# Patient Record
Sex: FEMALE | Race: WHITE | NOT HISPANIC OR LATINO | URBAN - METROPOLITAN AREA
[De-identification: names, ages, dates, MRNs, and addresses within clinical notes are randomized per-mention and may not be internally consistent; named-entity substitution may affect disease eponyms.]

---

## 2022-07-31 ENCOUNTER — INPATIENT (INPATIENT)
Facility: HOSPITAL | Age: 87
LOS: 3 days | Discharge: HOME | DRG: 176 | End: 2022-08-04
Attending: GENERAL PRACTICE | Admitting: GENERAL PRACTICE
Payer: MEDICARE

## 2022-07-31 VITALS
TEMPERATURE: 98 F | HEIGHT: 64 IN | OXYGEN SATURATION: 94 % | RESPIRATION RATE: 18 BRPM | DIASTOLIC BLOOD PRESSURE: 72 MMHG | HEART RATE: 108 BPM | WEIGHT: 111.99 LBS | SYSTOLIC BLOOD PRESSURE: 151 MMHG

## 2022-07-31 DIAGNOSIS — I49.9 CARDIAC ARRHYTHMIA, UNSPECIFIED: ICD-10-CM

## 2022-07-31 DIAGNOSIS — E05.90 THYROTOXICOSIS, UNSPECIFIED WITHOUT THYROTOXIC CRISIS OR STORM: ICD-10-CM

## 2022-07-31 DIAGNOSIS — E78.5 HYPERLIPIDEMIA, UNSPECIFIED: ICD-10-CM

## 2022-07-31 DIAGNOSIS — I82.90 ACUTE EMBOLISM AND THROMBOSIS OF UNSPECIFIED VEIN: ICD-10-CM

## 2022-07-31 DIAGNOSIS — I26.99 OTHER PULMONARY EMBOLISM WITHOUT ACUTE COR PULMONALE: ICD-10-CM

## 2022-07-31 LAB
ALBUMIN SERPL ELPH-MCNC: 4.1 G/DL — SIGNIFICANT CHANGE UP (ref 3.3–5)
ALP SERPL-CCNC: 74 U/L — SIGNIFICANT CHANGE UP (ref 40–120)
ALT FLD-CCNC: 15 U/L — SIGNIFICANT CHANGE UP (ref 10–45)
ANION GAP SERPL CALC-SCNC: 13 MMOL/L — SIGNIFICANT CHANGE UP (ref 5–17)
AST SERPL-CCNC: 15 U/L — SIGNIFICANT CHANGE UP (ref 10–40)
BASE EXCESS BLDV CALC-SCNC: -0.3 MMOL/L — SIGNIFICANT CHANGE UP (ref -2–2)
BASOPHILS # BLD AUTO: 0.02 K/UL — SIGNIFICANT CHANGE UP (ref 0–0.2)
BASOPHILS NFR BLD AUTO: 0.2 % — SIGNIFICANT CHANGE UP (ref 0–2)
BILIRUB SERPL-MCNC: 0.8 MG/DL — SIGNIFICANT CHANGE UP (ref 0.2–1.2)
BUN SERPL-MCNC: 19 MG/DL — SIGNIFICANT CHANGE UP (ref 7–23)
CA-I SERPL-SCNC: 1.18 MMOL/L — SIGNIFICANT CHANGE UP (ref 1.15–1.33)
CALCIUM SERPL-MCNC: 9.1 MG/DL — SIGNIFICANT CHANGE UP (ref 8.4–10.5)
CHLORIDE BLDV-SCNC: 102 MMOL/L — SIGNIFICANT CHANGE UP (ref 96–108)
CHLORIDE SERPL-SCNC: 101 MMOL/L — SIGNIFICANT CHANGE UP (ref 96–108)
CO2 BLDV-SCNC: 25 MMOL/L — SIGNIFICANT CHANGE UP (ref 22–26)
CO2 SERPL-SCNC: 22 MMOL/L — SIGNIFICANT CHANGE UP (ref 22–31)
CREAT SERPL-MCNC: 0.9 MG/DL — SIGNIFICANT CHANGE UP (ref 0.5–1.3)
EGFR: 61 ML/MIN/1.73M2 — SIGNIFICANT CHANGE UP
EOSINOPHIL # BLD AUTO: 0.03 K/UL — SIGNIFICANT CHANGE UP (ref 0–0.5)
EOSINOPHIL NFR BLD AUTO: 0.3 % — SIGNIFICANT CHANGE UP (ref 0–6)
FLUAV AG NPH QL: SIGNIFICANT CHANGE UP
FLUBV AG NPH QL: SIGNIFICANT CHANGE UP
GAS PNL BLDV: 133 MMOL/L — LOW (ref 136–145)
GAS PNL BLDV: SIGNIFICANT CHANGE UP
GAS PNL BLDV: SIGNIFICANT CHANGE UP
GLUCOSE BLDV-MCNC: 116 MG/DL — HIGH (ref 70–99)
GLUCOSE SERPL-MCNC: 119 MG/DL — HIGH (ref 70–99)
HCO3 BLDV-SCNC: 24 MMOL/L — SIGNIFICANT CHANGE UP (ref 22–29)
HCT VFR BLD CALC: 41.2 % — SIGNIFICANT CHANGE UP (ref 34.5–45)
HCT VFR BLDA CALC: 43 % — SIGNIFICANT CHANGE UP (ref 34.5–46.5)
HGB BLD CALC-MCNC: 14.3 G/DL — SIGNIFICANT CHANGE UP (ref 11.7–16.1)
HGB BLD-MCNC: 14.1 G/DL — SIGNIFICANT CHANGE UP (ref 11.5–15.5)
IMM GRANULOCYTES NFR BLD AUTO: 0.4 % — SIGNIFICANT CHANGE UP (ref 0–1.5)
LACTATE BLDV-MCNC: 1.4 MMOL/L — SIGNIFICANT CHANGE UP (ref 0.7–2)
LIDOCAIN IGE QN: 23 U/L — SIGNIFICANT CHANGE UP (ref 7–60)
LYMPHOCYTES # BLD AUTO: 0.79 K/UL — LOW (ref 1–3.3)
LYMPHOCYTES # BLD AUTO: 7.7 % — LOW (ref 13–44)
MAGNESIUM SERPL-MCNC: 1.6 MG/DL — SIGNIFICANT CHANGE UP (ref 1.6–2.6)
MCHC RBC-ENTMCNC: 29.7 PG — SIGNIFICANT CHANGE UP (ref 27–34)
MCHC RBC-ENTMCNC: 34.2 GM/DL — SIGNIFICANT CHANGE UP (ref 32–36)
MCV RBC AUTO: 86.9 FL — SIGNIFICANT CHANGE UP (ref 80–100)
MONOCYTES # BLD AUTO: 0.86 K/UL — SIGNIFICANT CHANGE UP (ref 0–0.9)
MONOCYTES NFR BLD AUTO: 8.4 % — SIGNIFICANT CHANGE UP (ref 2–14)
NEUTROPHILS # BLD AUTO: 8.48 K/UL — HIGH (ref 1.8–7.4)
NEUTROPHILS NFR BLD AUTO: 83 % — HIGH (ref 43–77)
NRBC # BLD: 0 /100 WBCS — SIGNIFICANT CHANGE UP (ref 0–0)
PCO2 BLDV: 38 MMHG — LOW (ref 39–42)
PH BLDV: 7.41 — SIGNIFICANT CHANGE UP (ref 7.32–7.43)
PLATELET # BLD AUTO: 191 K/UL — SIGNIFICANT CHANGE UP (ref 150–400)
PO2 BLDV: 33 MMHG — SIGNIFICANT CHANGE UP (ref 25–45)
POTASSIUM BLDV-SCNC: 4.3 MMOL/L — SIGNIFICANT CHANGE UP (ref 3.5–5.1)
POTASSIUM SERPL-MCNC: 4.3 MMOL/L — SIGNIFICANT CHANGE UP (ref 3.5–5.3)
POTASSIUM SERPL-SCNC: 4.3 MMOL/L — SIGNIFICANT CHANGE UP (ref 3.5–5.3)
PROT SERPL-MCNC: 7.6 G/DL — SIGNIFICANT CHANGE UP (ref 6–8.3)
RBC # BLD: 4.74 M/UL — SIGNIFICANT CHANGE UP (ref 3.8–5.2)
RBC # FLD: 13.2 % — SIGNIFICANT CHANGE UP (ref 10.3–14.5)
RSV RNA NPH QL NAA+NON-PROBE: SIGNIFICANT CHANGE UP
SAO2 % BLDV: 49.6 % — LOW (ref 67–88)
SARS-COV-2 RNA SPEC QL NAA+PROBE: SIGNIFICANT CHANGE UP
SODIUM SERPL-SCNC: 136 MMOL/L — SIGNIFICANT CHANGE UP (ref 135–145)
TROPONIN T, HIGH SENSITIVITY RESULT: 16 NG/L — SIGNIFICANT CHANGE UP (ref 0–51)
WBC # BLD: 10.22 K/UL — SIGNIFICANT CHANGE UP (ref 3.8–10.5)
WBC # FLD AUTO: 10.22 K/UL — SIGNIFICANT CHANGE UP (ref 3.8–10.5)

## 2022-07-31 PROCEDURE — 71275 CT ANGIOGRAPHY CHEST: CPT | Mod: 26,MA

## 2022-07-31 PROCEDURE — 93010 ELECTROCARDIOGRAM REPORT: CPT

## 2022-07-31 PROCEDURE — 71045 X-RAY EXAM CHEST 1 VIEW: CPT | Mod: 26

## 2022-07-31 PROCEDURE — 74177 CT ABD & PELVIS W/CONTRAST: CPT | Mod: 26,MA

## 2022-07-31 PROCEDURE — 99291 CRITICAL CARE FIRST HOUR: CPT | Mod: 25

## 2022-07-31 RX ORDER — LANOLIN ALCOHOL/MO/W.PET/CERES
3 CREAM (GRAM) TOPICAL AT BEDTIME
Refills: 0 | Status: DISCONTINUED | OUTPATIENT
Start: 2022-07-31 | End: 2022-08-04

## 2022-07-31 RX ORDER — ASPIRIN/CALCIUM CARB/MAGNESIUM 324 MG
81 TABLET ORAL DAILY
Refills: 0 | Status: DISCONTINUED | OUTPATIENT
Start: 2022-07-31 | End: 2022-08-04

## 2022-07-31 RX ORDER — ACETAMINOPHEN 500 MG
650 TABLET ORAL EVERY 6 HOURS
Refills: 0 | Status: DISCONTINUED | OUTPATIENT
Start: 2022-07-31 | End: 2022-08-04

## 2022-07-31 RX ORDER — DONEPEZIL HYDROCHLORIDE 10 MG/1
5 TABLET, FILM COATED ORAL AT BEDTIME
Refills: 0 | Status: DISCONTINUED | OUTPATIENT
Start: 2022-07-31 | End: 2022-08-04

## 2022-07-31 RX ORDER — ATORVASTATIN CALCIUM 80 MG/1
20 TABLET, FILM COATED ORAL AT BEDTIME
Refills: 0 | Status: DISCONTINUED | OUTPATIENT
Start: 2022-07-31 | End: 2022-08-04

## 2022-07-31 RX ORDER — MORPHINE SULFATE 50 MG/1
2 CAPSULE, EXTENDED RELEASE ORAL ONCE
Refills: 0 | Status: DISCONTINUED | OUTPATIENT
Start: 2022-07-31 | End: 2022-07-31

## 2022-07-31 RX ORDER — ONDANSETRON 8 MG/1
4 TABLET, FILM COATED ORAL EVERY 8 HOURS
Refills: 0 | Status: DISCONTINUED | OUTPATIENT
Start: 2022-07-31 | End: 2022-08-04

## 2022-07-31 RX ORDER — ATORVASTATIN CALCIUM 80 MG/1
1 TABLET, FILM COATED ORAL
Qty: 0 | Refills: 0 | DISCHARGE

## 2022-07-31 RX ORDER — HEPARIN SODIUM 5000 [USP'U]/ML
4500 INJECTION INTRAVENOUS; SUBCUTANEOUS ONCE
Refills: 0 | Status: COMPLETED | OUTPATIENT
Start: 2022-07-31 | End: 2022-07-31

## 2022-07-31 RX ORDER — HEPARIN SODIUM 5000 [USP'U]/ML
INJECTION INTRAVENOUS; SUBCUTANEOUS
Qty: 25000 | Refills: 0 | Status: DISCONTINUED | OUTPATIENT
Start: 2022-07-31 | End: 2022-08-02

## 2022-07-31 RX ORDER — HEPARIN SODIUM 5000 [USP'U]/ML
2000 INJECTION INTRAVENOUS; SUBCUTANEOUS EVERY 6 HOURS
Refills: 0 | Status: DISCONTINUED | OUTPATIENT
Start: 2022-07-31 | End: 2022-08-02

## 2022-07-31 RX ORDER — DONEPEZIL HYDROCHLORIDE 10 MG/1
1 TABLET, FILM COATED ORAL
Qty: 0 | Refills: 0 | DISCHARGE

## 2022-07-31 RX ORDER — ASPIRIN/CALCIUM CARB/MAGNESIUM 324 MG
1 TABLET ORAL
Qty: 0 | Refills: 0 | DISCHARGE

## 2022-07-31 RX ORDER — METHIMAZOLE 10 MG/1
0.5 TABLET ORAL
Qty: 0 | Refills: 0 | DISCHARGE

## 2022-07-31 RX ORDER — METOPROLOL TARTRATE 50 MG
25 TABLET ORAL
Refills: 0 | Status: DISCONTINUED | OUTPATIENT
Start: 2022-07-31 | End: 2022-08-04

## 2022-07-31 RX ORDER — METOPROLOL TARTRATE 50 MG
1 TABLET ORAL
Qty: 0 | Refills: 0 | DISCHARGE

## 2022-07-31 RX ORDER — HEPARIN SODIUM 5000 [USP'U]/ML
4500 INJECTION INTRAVENOUS; SUBCUTANEOUS EVERY 6 HOURS
Refills: 0 | Status: DISCONTINUED | OUTPATIENT
Start: 2022-07-31 | End: 2022-08-02

## 2022-07-31 RX ADMIN — MORPHINE SULFATE 2 MILLIGRAM(S): 50 CAPSULE, EXTENDED RELEASE ORAL at 12:46

## 2022-07-31 RX ADMIN — HEPARIN SODIUM 1100 UNIT(S)/HR: 5000 INJECTION INTRAVENOUS; SUBCUTANEOUS at 17:20

## 2022-07-31 RX ADMIN — MORPHINE SULFATE 2 MILLIGRAM(S): 50 CAPSULE, EXTENDED RELEASE ORAL at 15:36

## 2022-07-31 RX ADMIN — HEPARIN SODIUM 4500 UNIT(S): 5000 INJECTION INTRAVENOUS; SUBCUTANEOUS at 17:20

## 2022-07-31 NOTE — ED PROVIDER NOTE - CLINICAL SUMMARY MEDICAL DECISION MAKING FREE TEXT BOX
Pt is a 89 yo f with pmh arrythmia (unknown which kind) and hypothyroid who presents to ED with few days of ruq abdominal pain that radiates to back and shortness of breath. r/o PE vs gallstone. Pt is hypoxic to 95%, on 2l NC.
71

## 2022-07-31 NOTE — ED PROVIDER NOTE - PROGRESS NOTE DETAILS
Prudencio Bynum, PGY2 Pt has b/l PE with r heart strain.   Spoke with cards fellow for PE and right heart strain.   Vascular surgery was consulted for aortic ulcers. awaiting recs for heparin in setting aortic ulcers. .ndb pages vascular again  on call sheet  and paged [phone] and   found on amion Mushtaq Arciniega MD: Patient was signed out to me pending cardiology consultation for PE with right heart strain and vascular surgery consultation for penetrating aortic ulcer to make sure patient can be anticoagulated. Seen by cardiology team, no plan for catheter directed thrombolysis, but agreed with plan for anticoagulation. Unable to obtain callback from vascular team, which Dr. Ten Ferguson who was made aware of patient and clinical and imaging findings, agreed with plan for anticoagulation, and will have vascular team see patient.

## 2022-07-31 NOTE — CONSULT NOTE ADULT - SUBJECTIVE AND OBJECTIVE BOX
CHIEF COMPLAINT:Patient is a 88y old  Female who presents with a chief complaint of     HPI:   Pt is a 87 yo f poor-historian with pmh arrythmia (unknown which kind) and hypothyroid who presents to ED with few days of ruq abdominal pain that radiates to back and shortness of breath, no cp, n/v/f/c. No hx of dvt. Takes aspirin intermittently for pain, no AC    PAST MEDICAL & SURGICAL HISTORY:      MEDICATIONS  (STANDING):  heparin  Infusion.  Unit(s)/Hr (11 mL/Hr) IV Continuous <Continuous>  morphine  - Injectable 2 milliGRAM(s) IV Push once    MEDICATIONS  (PRN):  heparin   Injectable 4500 Unit(s) IV Push every 6 hours PRN For aPTT less than 40  heparin   Injectable 2000 Unit(s) IV Push every 6 hours PRN For aPTT between 40 - 57      FAMILY HISTORY:      SOCIAL HISTORY:    [ ] Non-smoker  [ ] Smoker  [ ] Alcohol    Allergies    No Known Allergies    Intolerances    	    REVIEW OF SYSTEMS:  CONSTITUTIONAL: No fever, weight loss, or fatigue  EYES: No eye pain, visual disturbances, or discharge  ENT:  No difficulty hearing, tinnitus, vertigo; No sinus or throat pain  NECK: No pain or stiffness  RESPIRATORY: No cough, wheezing, chills or hemoptysis; No Shortness of Breath  CARDIOVASCULAR: + chest pain, palpitations, passing out, dizziness, or leg swelling  GASTROINTESTINAL: + abdominal or epigastric pain. No nausea, vomiting, or hematemesis; No diarrhea or constipation. No melena or hematochezia.  GENITOURINARY: No dysuria, frequency, hematuria, or incontinence  NEUROLOGICAL: No headaches, memory loss, loss of strength, numbness, or tremors  SKIN: No itching, burning, rashes, or lesions   LYMPH Nodes: No enlarged glands  ENDOCRINE: No heat or cold intolerance; No hair loss  MUSCULOSKELETAL: No joint pain or swelling; No muscle, back, or extremity pain  PSYCHIATRIC: No depression, anxiety, mood swings, or difficulty sleeping  HEME/LYMPH: No easy bruising, or bleeding gums  ALLERGY AND IMMUNOLOGIC: No hives or eczema	    [ ] All others negative	  [ ] Unable to obtain    PHYSICAL EXAM:  T(C): 37.6 (07-31-22 @ 16:57), Max: 37.6 (07-31-22 @ 16:57)  HR: 99 (07-31-22 @ 16:57) (99 - 110)  BP: 138/67 (07-31-22 @ 16:57) (129/64 - 151/72)  RR: 37 (07-31-22 @ 16:57) (18 - 37)  SpO2: 95% (07-31-22 @ 16:57) (94% - 95%)  Wt(kg): --  I&O's Summary      Appearance: Normal	  HEENT:   Normal oral mucosa, PERRL, EOMI	  Lymphatic: No lymphadenopathy  Cardiovascular: Normal S1 S2, No JVD, + murmurs, No edema  Respiratory: rhonchi  Psychiatry: A & O x 3, Mood & affect appropriate  Gastrointestinal:  Soft, Non-tender, + BS	  Skin: No rashes, No ecchymoses, No cyanosis	  Neurologic: Non-focal  Extremities: Normal range of motion, No clubbing, cyanosis or edema  Vascular: Peripheral pulses palpable 2+ bilaterally    TELEMETRY: 	    ECG:  	  RADIOLOGY:  OTHER: 	  	  LABS:	 	    CARDIAC MARKERS:                              14.1   10.22 )-----------( 191      ( 31 Jul 2022 12:40 )             41.2     07-31    136  |  101  |  19  ----------------------------<  119<H>  4.3   |  22  |  0.90    Ca    9.1      31 Jul 2022 12:40  Mg     1.6     07-31    TPro  7.6  /  Alb  4.1  /  TBili  0.8  /  DBili  x   /  AST  15  /  ALT  15  /  AlkPhos  74  07-31    proBNP:   Lipid Profile:   HgA1c:   TSH:       PREVIOUS DIAGNOSTIC TESTING:    < from: CT Abdomen and Pelvis w/ IV Cont (07.31.22 @ 13:48) >  Acute bilateral pulmonary embolism, more prominent in the right lower   lobe. Right heart strain. Patchy airspace consolidation in the right   lower lobe and right middle lobe which likely represent   atelectasis/pneumonia, however pulmonary infarct cannot be excluded in   the setting of acute pulmonary embolism. Trace right pleural effusion.    Acute DVT left femoral vein, possible acute DVT right femoral vein.    Ulcerated plaques of the aortic arch and proximal descending thoracic   aorta.  1.9 cm focal outpouching of contrast into the left lateral aortic   wall at the aortic hiatus consistent with a penetrating ulcer.    - asymptomatic aortic ulceration - no acute surgical intervention okay to give hep gtt for PEs  - appreciate recs of primary team  - will follow  - pain is not characteristic 2/2 to aortic etiology, continue to monitor pain and physical exam    HDS, troponin negative. Currently on 2L NC. Patient also found to have ulcerated plaque in aorta.   -Recommend echo   -Please place on heparin gtt when cleared by vascular given her ulcerated plaque   -Recommend DVT doppler bilateral in LE   -Tele   -Wean O2 as able   -Please obtain patient's med rec     < from: CT Abdomen and Pelvis w/ IV Cont (07.31.22 @ 13:48) >  RETROPERITONEUM/LYMPH NODES: No lymphadenopathy.  ABDOMINAL WALL: Within normal limits.  BONES: Mild thoracolumbar scoliosis.Multilevel degenerative changes of   the spine worse at L4-5 and L5-S1. Grade 1 anterolisthesis L4 over L5.    < end of copied text >

## 2022-07-31 NOTE — H&P ADULT - HISTORY OF PRESENT ILLNESS
>>>>>>Medical Attending Initial / Admission note<<<<<<  -------------------------------------------------------------------------------  CHIEF COMPLAINT & HISTORY OF PRESENT ILLNESS:      history per chart and Dtr ( mostly Mosotho speaking)   Patient is a 89 yo Female ( lives independently, normal mental status per Dtr)  with past medical history of arrythmia (unknown which kind) and hyperthyroidism, prior DVT and HLD, who presents to ED with few days of ruq abdominal pain that radiates to back and shortness of breath, no cp, n/v/f/c.   pt in ED diagnosed with acute PE as bellow and started on Heparin drip  pt otherwise comfortable, on minimal O2   pt also incidentally on CT found to have an aortic ulcer as bellow, vascular consulted and noted   per pt's Dtr pt had a DVT last year, was on Xarelto but pt had reportedly stopped taking it ? months ago as was feeling better..     --------------------------------------------------------------------------------  PAST MEDICAL HISTORY:    hyperthyroidism  DVT h/o ~ 2021  HLD  ? MCI    --------------------------------------------------------------------------------  PAST SURGICAL HISTORY:    none reported    --------------------------------------------------------------------------------  FAMILY HISTORY:    no known cancer of heart disease reported in family    --------------------------------------------------------------------------------  SOCIAL HISTORY:  Alcohol: None reported  Smoking: None reported     --------------------------------------------------------------------------------  ALLERGIES:    [As bellow, reviewed]    --------------------------------------------------------------------------------  MEDICATIONS:   [As bellow, reviewed]    --------------------------------------------------------------------------------  REVIEW OF SYSTEM:    limited   GEN: no fever, no pain  RESP: no SOB, no cough  CVS: no chest pain, no palpitations  GI: no abdominal pain, no nausea  : no dysuria, no frequency  Neuro: no headache, no dizziness  Derm : no itching, no rash    --------------------------------------------------------------------------------  VITAL SIGNS:  Height (cm): 162.6  Weight (kg): 55.8  BMI (kg/m2): 21.1  Vital Signs Last 24 HrsT(C): 37.6 (07-31-22 @ 16:57)  T(F): 99.6 (07-31-22 @ 16:57), Max: 99.6 (07-31-22 @ 16:57)  HR: 99 (07-31-22 @ 16:57) (99 - 110)  BP: 138/67 (07-31-22 @ 16:57)  RR: 37 (07-31-22 @ 16:57) (18 - 37)  SpO2: 95% (07-31-22 @ 16:57) (94% - 95%)      --------------------------------------------------------------------------------  PHYSICAL EXAM:    GEN: alert and awake, NAD , comfortable, pleasant, calm  HEENT: NCAT, PERRL, MMM, hearing intact  Neck: supple , no JVD  CVS: S1S2 , regular , No M/R/G appreciated  PULM: CTA B/L,  no W/R/R appreciated  ABD.: soft. non tender, non distended,  bowel sounds present  Extrem: intact pulses , no edema   Derm: No rash , no ecchymoses  PSYCH : normal mood,  no delusion not anxious    --------------------------------------------------------------------------------  LAB AND IMAGING:   [As colby, bob]    --------------------------------------------------------------------------------  ASSESSMENT AND PLAN:   [As belljessica]    --------------------  Case discussed with pt, Dtr, Cardio   ___________________________  H. DORA Collins.  Pager: [phone]  07-31-22   >>>>>>Medical Attending Initial / Admission note<<<<<<  -------------------------------------------------------------------------------  CHIEF COMPLAINT & HISTORY OF PRESENT ILLNESS:      history per chart and Dtr ( mostly Japanese speaking)   Patient is a 89 yo Female ( lives independently, normal mental status per Dtr)  with past medical history of arrythmia (unknown which kind) and hyperthyroidism, prior DVT and HLD, who presents to ED with few days of ruq abdominal pain that radiates to back and shortness of breath, no cp, n/v/f/c.   pt in ED diagnosed with acute PE as bellow and started on Heparin drip  pt otherwise comfortable, on minimal O2   pt also incidentally on CT found to have an aortic ulcerated plaque as bellow, vascular consulted and noted   per pt's Dtr pt had a DVT last year, was on Xarelto but pt had reportedly stopped taking it ? months ago as was feeling better..     --------------------------------------------------------------------------------  PAST MEDICAL HISTORY:    hyperthyroidism  DVT h/o ~ 2021  HLD  ? MCI    --------------------------------------------------------------------------------  PAST SURGICAL HISTORY:    none reported    --------------------------------------------------------------------------------  FAMILY HISTORY:    no known cancer of heart disease reported in family    --------------------------------------------------------------------------------  SOCIAL HISTORY:  Alcohol: None reported  Smoking: None reported     --------------------------------------------------------------------------------  ALLERGIES:    [As bellow, reviewed]    --------------------------------------------------------------------------------  MEDICATIONS:   [As bellow, reviewed]    --------------------------------------------------------------------------------  REVIEW OF SYSTEM:    limited   GEN: no fever, no pain  RESP: no SOB, no cough  CVS: no chest pain, no palpitations  GI: no abdominal pain, no nausea  : no dysuria, no frequency  Neuro: no headache, no dizziness  Derm : no itching, no rash    --------------------------------------------------------------------------------  VITAL SIGNS:  Height (cm): 162.6  Weight (kg): 55.8  BMI (kg/m2): 21.1  Vital Signs Last 24 HrsT(C): 37.6 (07-31-22 @ 16:57)  T(F): 99.6 (07-31-22 @ 16:57), Max: 99.6 (07-31-22 @ 16:57)  HR: 99 (07-31-22 @ 16:57) (99 - 110)  BP: 138/67 (07-31-22 @ 16:57)  RR: 37 (07-31-22 @ 16:57) (18 - 37)  SpO2: 95% (07-31-22 @ 16:57) (94% - 95%)      --------------------------------------------------------------------------------  PHYSICAL EXAM:    GEN: alert and awake, NAD , comfortable, pleasant, calm  HEENT: NCAT, PERRL, MMM, hearing intact  Neck: supple , no JVD  CVS: S1S2 , regular , No M/R/G appreciated  PULM: CTA B/L,  no W/R/R appreciated  ABD.: soft. non tender, non distended,  bowel sounds present  Extrem: intact pulses , no edema   Derm: No rash , no ecchymoses  PSYCH : normal mood,  no delusion not anxious    --------------------------------------------------------------------------------  LAB AND IMAGING:   [As colby, bob]    --------------------------------------------------------------------------------  ASSESSMENT AND PLAN:   [As bellow]    --------------------  Case discussed with pt, Dtr, Cardio   ___________________________  H. DORA Collins.  Pager: [phone]  07-31-22

## 2022-07-31 NOTE — H&P ADULT - PROBLEM SELECTOR PLAN 1
pt with proximal DVTs and bilateral PEs  pt was supposed to be on Xarelto but stopped taking it per Dtr..   pt on heparin drip  likely will transition back to Xarelto or other NOAcs soon     will need life long AC ( D/W Dtr)   check Echo  tele  malignancy workup / cancer markers  monitor vitals labs

## 2022-07-31 NOTE — CONSULT NOTE ADULT - SUBJECTIVE AND OBJECTIVE BOX
VASCULAR SURGERY CONSULT NOTE  --------------------------------------------------------------------------------------------    Patient is a 88y old  Female who presents with a chief complaint of     HPI: HPI:      ROS: 10-system review is otherwise negative except HPI above.      PAST MEDICAL & SURGICAL HISTORY:    FAMILY HISTORY:      SOCIAL HISTORY:      ALLERGIES: No Known Allergies      HOME MEDICATIONS:     CURRENT MEDICATIONS  MEDICATIONS (STANDING): heparin  Infusion.  Unit(s)/Hr IV Continuous <Continuous>  morphine  - Injectable 2 milliGRAM(s) IV Push Once    MEDICATIONS (PRN):heparin   Injectable 4500 Unit(s) IV Push every 6 hours PRN For aPTT less than 40  heparin   Injectable 2000 Unit(s) IV Push every 6 hours PRN For aPTT between 40 - 57    --------------------------------------------------------------------------------------------    Vitals:   T(C): 37.6 (07-31-22 @ 16:57), Max: 37.6 (07-31-22 @ 16:57)  HR: 99 (07-31-22 @ 16:57) (99 - 110)  BP: 138/67 (07-31-22 @ 16:57) (129/64 - 151/72)  RR: 37 (07-31-22 @ 16:57) (18 - 37)  SpO2: 95% (07-31-22 @ 16:57) (94% - 95%)  CAPILLARY BLOOD GLUCOSE        CAPILLARY BLOOD GLUCOSE          Height (cm): 162.6 (07-31 @ 11:33)  Weight (kg): 55.8 (07-31 @ 14:37)  BMI (kg/m2): 21.1 (07-31 @ 14:37)  BSA (m2): 1.59 (07-31 @ 14:37)    PHYSICAL EXAM:   General: NAD, Lying in bed comfortably  Neuro: A+Ox3  HEENT: NC/AT, EOMI  Neck: Soft, supple  Cardio: RRR, nml S1/S2  Resp: Good effort, on oxygen, tachypnea  GI/Abd: Soft, NT/ND, no rebound/guarding, no masses palpated  Vascular: distal pulses intact, dp/pt palpable b/l  Skin: Intact, no breakdown  Lymphatic/Nodes: No palpable lymphadenopathy  Musculoskeletal: All 4 extremities moving spontaneously, no limitations.  --------------------------------------------------------------------------------------------    LABS  CBC (07-31 @ 12:40)                              14.1                           10.22   )----------------(  191        83.0<H>% Neutrophils, 7.7<L>% Lymphocytes, ANC: 8.48<H>                              41.2      BMP (07-31 @ 12:40)             136     |  101     |  19    		Ca++ --      Ca 9.1                ---------------------------------( 119<H>		Mg 1.6                4.3     |  22      |  0.90  			Ph --        LFTs (07-31 @ 12:40)      TPro 7.6 / Alb 4.1 / TBili 0.8 / DBili -- / AST 15 / ALT 15 / AlkPhos 74          VBG (07-31 @ 12:31)     7.41 / 38<L> / 33 / 24 / -0.3 / 49.6<L>%     Lactate: 1.4    --------------------------------------------------------------------------------------------    MICROBIOLOGY      --------------------------------------------------------------------------------------------    IMAGING     VASCULAR SURGERY CONSULT NOTE  --------------------------------------------------------------------------------------------    Patient is a 88y old  Female who presents with a chief complaint of shortness of breath    HPI: HPI:  89 yo F w/ PMH hypothyroidism who presents with chest pain and SOB. Since Friday, patient has been having right sided chest pain radiating to her back. Worse when lying flat. Also has been feeling SOB as well at rest and on exertion. Denies any palpitations, lightheadedness, dizziness. Never had these symptoms before. Never had leg pain or swelling. Denies any hx of malignancy, air travel, recent surgeries. Not up to date on her cancer screenings.     In the ED, low 90s on RA, put on 2L NC, HR in low 100s, rest of vitals stable.     Vascular consulted due to incidental aortic ulceration found on CT imaging. She also has complaints of mild lower back pain mostly on the right that is worse with pressure.     ROS: 10-system review is otherwise negative except HPI above.      PAST MEDICAL & SURGICAL HISTORY:    FAMILY HISTORY:      SOCIAL HISTORY:      ALLERGIES: No Known Allergies      HOME MEDICATIONS:     CURRENT MEDICATIONS  MEDICATIONS (STANDING): heparin  Infusion.  Unit(s)/Hr IV Continuous <Continuous>  morphine  - Injectable 2 milliGRAM(s) IV Push Once    MEDICATIONS (PRN):heparin   Injectable 4500 Unit(s) IV Push every 6 hours PRN For aPTT less than 40  heparin   Injectable 2000 Unit(s) IV Push every 6 hours PRN For aPTT between 40 - 57    --------------------------------------------------------------------------------------------    Vitals:   T(C): 37.6 (07-31-22 @ 16:57), Max: 37.6 (07-31-22 @ 16:57)  HR: 99 (07-31-22 @ 16:57) (99 - 110)  BP: 138/67 (07-31-22 @ 16:57) (129/64 - 151/72)  RR: 37 (07-31-22 @ 16:57) (18 - 37)  SpO2: 95% (07-31-22 @ 16:57) (94% - 95%)  CAPILLARY BLOOD GLUCOSE        CAPILLARY BLOOD GLUCOSE          Height (cm): 162.6 (07-31 @ 11:33)  Weight (kg): 55.8 (07-31 @ 14:37)  BMI (kg/m2): 21.1 (07-31 @ 14:37)  BSA (m2): 1.59 (07-31 @ 14:37)    PHYSICAL EXAM:   General: NAD, Lying in bed comfortably  Neuro: A+Ox3  HEENT: NC/AT, EOMI  Neck: Soft, supple  Cardio: RRR, nml S1/S2  Resp: Good effort, on oxygen, tachypnea  GI/Abd: Soft, NT/ND, no rebound/guarding, no masses palpated  Vascular: distal pulses intact, dp/pt palpable b/l  Skin: Intact, no breakdown  Lymphatic/Nodes: No palpable lymphadenopathy  Musculoskeletal: All 4 extremities moving spontaneously, no limitations. minimal tenderness to palpation over right sided paraspinal muscles  --------------------------------------------------------------------------------------------    LABS  CBC (07-31 @ 12:40)                              14.1                           10.22   )----------------(  191        83.0<H>% Neutrophils, 7.7<L>% Lymphocytes, ANC: 8.48<H>                              41.2      BMP (07-31 @ 12:40)             136     |  101     |  19    		Ca++ --      Ca 9.1                ---------------------------------( 119<H>		Mg 1.6                4.3     |  22      |  0.90  			Ph --        LFTs (07-31 @ 12:40)      TPro 7.6 / Alb 4.1 / TBili 0.8 / DBili -- / AST 15 / ALT 15 / AlkPhos 74          VBG (07-31 @ 12:31)     7.41 / 38<L> / 33 / 24 / -0.3 / 49.6<L>%     Lactate: 1.4    --------------------------------------------------------------------------------------------    MICROBIOLOGY      --------------------------------------------------------------------------------------------    IMAGING

## 2022-07-31 NOTE — ED PROVIDER NOTE - ATTENDING CONTRIBUTION TO CARE
87 yo female p/w vague back discomfort, accompanied by son, notes his mother usually does not complain and that he feared something was wrong.  CT chest/abdomen/pelvis with bilateral PEs, right heart strain, and aortic ulceration noted.  urgent consult to vascular surgery, clarify ability to anticoagulate in the setting of aortic ulceration, otherwise will start heparin GTT, PE response team consult, admit for further management.

## 2022-07-31 NOTE — CONSULT NOTE ADULT - ATTENDING COMMENTS
Continue with anticoagulation alone  no plans for any catheter based therapies for this PE  At some point would convert to DOAC  She has proximal L DVT, consider CT Abd/Pelvis for malignancy screening    Aisha [phone]

## 2022-07-31 NOTE — H&P ADULT - ASSESSMENT
Patient is a 87 yo Female ( lives independently, normal mental status per Dtr)  with past medical history of arrythmia (unknown which kind) and hyperthyroidism, prior DVT and HLD, who presents to ED with few days of ruq abdominal pain that radiates to back and shortness of breath, no cp, n/v/f/c.   pt in ED diagnosed with acute PE as bellow and started on Heparin drip  pt otherwise comfortable, on minimal O2   pt also incidentally on CT found to have an aortic ulcerated plaque as bellow, vascular consulted and noted   per pt's Dtr pt had a DVT last year, was on Xarelto but pt had reportedly stopped taking it ? months ago as was feeling better..

## 2022-07-31 NOTE — CONSULT NOTE ADULT - ASSESSMENT
89 yo F w/ PMH hypothyroidism who presents with chest pain and SOB found to have bilateral PE.     #Bilateral PE   HDS, troponin negative. Currently on 2L NC. Patient also found to have ulcerated plaque in aorta.   -Recommend echo   -Please place on heparin gtt when cleared by vascular given her ulcerated plaque   -Recommend DVT doppler bilateral in LE   -Tele   -Wean O2 as able   -Please obtain patient's med rec

## 2022-07-31 NOTE — H&P ADULT - CONVERSATION DETAILS
*** Advance directive /  goals of care discussion      I had a long discussion with patient ( and family) about patient's overall diagnosis, expected prognosis, and potential complications.       Discussed treatment options, comfort care / hospice as appropriate, and all other potential options of care.         Discussed risks, benefits, and alternatives of treatment as well.          Opportunity given for and all questions answered.            Reviewed available advance directives as available > pt has a living will, which the Dtr is not aware of the detail... pt's Dtr is HCP>> they will be reaching out to the  to get copies of the living will and other paper work ..      At this time patient to be > full code, with continuation of current medical therapy.     Goal is for pt to return > home and follow up as outpatient with current doctors      will continue to discuss GOC with pt and family and update plan as needed.     Patient's family have my contact information and will contact me with questions.     Additional time spent on Goals of care: 22 min.

## 2022-07-31 NOTE — CONSULT NOTE ADULT - SUBJECTIVE AND OBJECTIVE BOX
Patient seen and evaluated at bedside    Chief Complaint:    HPI:  87 yo F w/ PMH hypothyroidism who presents with chest pain and SOB. Since Friday, patient has been having ri    PMHx:       PSHx:       Allergies:  No Known Allergies      Home Meds:    Current Medications:   morphine  - Injectable 2 milliGRAM(s) IV Push Once      FAMILY HISTORY:      Social History:  Smoking History:  Alcohol Use:  Drug Use:    REVIEW OF SYSTEMS:  Constitutional:     [x ] negative [ ] fevers [ ] chills [ ] weight loss [ ] weight gain  HEENT:                  [x ] negative [ ] dry eyes [ ] eye irritation [ ] postnasal drip [ ] nasal congestion  CV:                         [ x] negative  [ ] chest pain [ ] orthopnea [ ] palpitations [ ] murmur  Resp:                     [x ] negative [ ] cough [ ] shortness of breath [ ] dyspnea [ ] wheezing [ ] sputum [ ]hemoptysis  GI:                          [ x] negative [ ] nausea [ ] vomiting [ ] diarrhea [ ] constipation [ ] abd pain [ ] dysphagia   :                        [ x] negative [ ] dysuria [ ] nocturia [ ] hematuria [ ] increased urinary frequency  Musculoskeletal: [x ] negative [ ] back pain [ ] myalgias [ ] arthralgias [ ] fracture  Skin:                       [ x] negative [ ] rash [ ] itch  Neurological:        [ x] negative [ ] headache [ ] dizziness [ ] syncope [ ] weakness [ ] numbness  Psychiatric:           [ x] negative [ ] anxiety [ ] depression  Endocrine:            [ x] negative [ ] diabetes [ ] thyroid problem  Heme/Lymph:      [ x] negative [ ] anemia [ ] bleeding problem  Allergic/Immune: [ x] negative [ ] itchy eyes [ ] nasal discharge [ ] hives [ ] angioedema    [ x] All other systems negative  [ ] Unable to assess ROS due to      Physical Exam:  T(F): 98.3 (07-31), Max: 98.3 (07-31)  HR: 105 (07-31) (103 - 110)  BP: 149/74 (07-31) (143/66 - 151/72)  RR: 27 (07-31)  SpO2: 95% (07-31)  GENERAL: No acute distress, well-developed  HEAD:  Atraumatic, Normocephalic  ENT: EOMI, PERRLA, conjunctiva and sclera clear, Neck supple, No JVD, moist mucosa  CHEST/LUNG: Clear to auscultation bilaterally; No wheeze, equal breath sounds bilaterally   BACK: No spinal tenderness  HEART: Regular rate and rhythm; No murmurs, rubs, or gallops  ABDOMEN: Soft, Nontender, Nondistended; Bowel sounds present  EXTREMITIES:  No clubbing, cyanosis, or edema  PSYCH: Nl behavior, nl affect  NEUROLOGY: AAOx3, non-focal, cranial nerves intact  SKIN: Normal color, No rashes or lesions  LINES:    Cardiovascular Diagnostic Testing:    ECG: Personally reviewed:    Echo: Personally reviewed:    Stress Testing:    Cath:    Imaging:    CXR: Personally reviewed    Labs: Personally reviewed                        14.1   10.22 )-----------( 191      ( 31 Jul 2022 12:40 )             41.2     07-31    136  |  101  |  19  ----------------------------<  119<H>  4.3   |  22  |  0.90    Ca    9.1      31 Jul 2022 12:40  Mg     1.6     07-31    TPro  7.6  /  Alb  4.1  /  TBili  0.8  /  DBili  x   /  AST  15  /  ALT  15  /  AlkPhos  74  07-31             Patient seen and evaluated at bedside    Chief Complaint:    HPI:  87 yo F w/ PMH hypothyroidism who presents with chest pain and SOB. Since Friday, patient has been having right sided chest pain radiating to her back. Worse when lying flat. Also has been feeling SOB as well at rest and on exertion. Denies any palpitations, lightheadedness, dizziness. Never had these symptoms before. Never had leg pain or swelling. Denies any hx of malignancy, air travel, recent surgeries. Not up to date on her cancer screenings.     In the ED, low 90s on RA, put on 2L NC, HR in low 100s, rest of vitals stable.     EKG shows sinus tachycardia     PMHx:       PSHx:       Allergies:  No Known Allergies      Home Meds:    Current Medications:   morphine  - Injectable 2 milliGRAM(s) IV Push Once      FAMILY HISTORY:      Social History:  Smoking History:  Alcohol Use:  Drug Use:    REVIEW OF SYSTEMS:  Constitutional:     [x ] negative [ ] fevers [ ] chills [ ] weight loss [ ] weight gain  HEENT:                  [x ] negative [ ] dry eyes [ ] eye irritation [ ] postnasal drip [ ] nasal congestion  CV:                         [ x] negative  [ ] chest pain [ ] orthopnea [ ] palpitations [ ] murmur  Resp:                     [x ] negative [ ] cough [ ] shortness of breath [ ] dyspnea [ ] wheezing [ ] sputum [ ]hemoptysis  GI:                          [ x] negative [ ] nausea [ ] vomiting [ ] diarrhea [ ] constipation [ ] abd pain [ ] dysphagia   :                        [ x] negative [ ] dysuria [ ] nocturia [ ] hematuria [ ] increased urinary frequency  Musculoskeletal: [x ] negative [ ] back pain [ ] myalgias [ ] arthralgias [ ] fracture  Skin:                       [ x] negative [ ] rash [ ] itch  Neurological:        [ x] negative [ ] headache [ ] dizziness [ ] syncope [ ] weakness [ ] numbness  Psychiatric:           [ x] negative [ ] anxiety [ ] depression  Endocrine:            [ x] negative [ ] diabetes [ ] thyroid problem  Heme/Lymph:      [ x] negative [ ] anemia [ ] bleeding problem  Allergic/Immune: [ x] negative [ ] itchy eyes [ ] nasal discharge [ ] hives [ ] angioedema    [ x] All other systems negative  [ ] Unable to assess ROS due to      Physical Exam:  T(F): 98.3 (07-31), Max: 98.3 (07-31)  HR: 105 (07-31) (103 - 110)  BP: 149/74 (07-31) (143/66 - 151/72)  RR: 27 (07-31)  SpO2: 95% (07-31)  GENERAL: No acute distress, on NC   CHEST/LUNG: Clear to auscultation bilaterally; No wheeze, equal breath sounds bilaterally   HEART: Regular rate and rhythm; No murmurs, rubs, or gallops  EXTREMITIES:  No clubbing, cyanosis, or edema  PSYCH: Nl behavior, nl affect  NEUROLOGY: AAOx3, non-focal   SKIN: Normal color, No rashes or lesions  LINES:    Cardiovascular Diagnostic Testing:    ECG: Personally reviewed:    Echo: Personally reviewed:    Stress Testing:    Cath:    Imaging:    CXR: Personally reviewed    Labs: Personally reviewed                        14.1   10.22 )-----------( 191      ( 31 Jul 2022 12:40 )             41.2     07-31    136  |  101  |  19  ----------------------------<  119<H>  4.3   |  22  |  0.90    Ca    9.1      31 Jul 2022 12:40  Mg     1.6     07-31    TPro  7.6  /  Alb  4.1  /  TBili  0.8  /  DBili  x   /  AST  15  /  ALT  15  /  AlkPhos  74  07-31

## 2022-07-31 NOTE — CONSULT NOTE ADULT - ASSESSMENT
89 yo F w/ PMH hypothyroidism who presents with chest pain and SOB found to have bilateral PE and 1.9 cm aortic ulceration    Plan:  - asymptomatic aortic ulceration - no acute surgical intervention okay to give hep gtt for PEs  - appreciate recs of primary team  - to be discussed with fellow/attending    [facility] [id number] 87 yo F w/ PMH hypothyroidism who presents with chest pain and SOB found to have bilateral PE and 1.9 cm aortic ulceration    Plan:  - asymptomatic aortic ulceration - no acute surgical intervention okay to give hep gtt for PEs  - appreciate recs of primary team  - will follow  - pain is not characteristic 2/2 to aortic etiology, continue to monitor pain and physical exam    [facility] [id number]

## 2022-07-31 NOTE — CONSULT NOTE ADULT - ASSESSMENT
Pt is a 89 yo f poor-historian with pmh arrythmia (unknown which kind) and hypothyroid who presents to ED with few days of ruq abdominal pain that radiates to back and shortness of breath, no cp, n/v/f/c. No hx of dvt. Takes aspirin intermittently for pain.  pt with LBL significant PE / vascular cardiology appreciated  Penetrating Aortic Ulcer/ vascular appreciated no surgery, cleared for AC  beta blocker as tolerated  started on IV Heparin  +doppler  r/o malignancy  fu trop  pro bnp  echo in am  pt is doing well, o2 sat is okay

## 2022-07-31 NOTE — ED PROVIDER NOTE - OBJECTIVE STATEMENT
Pt is a 87 yo f poor-historian with pmh arrythmia (unknown which kind) and hypothyroid who presents to ED with few days of ruq abdominal pain that radiates to back and shortness of breath, no cp, n/v/f/c. No hx of dvt. Takes aspirin intermittently for pain, no AC.  PMD: Hai   Card: Qa

## 2022-07-31 NOTE — ED ADULT NURSE NOTE - OBJECTIVE STATEMENT
88Y Female A&Ox(-), PMH (-), PSH (-), presents to the ED, ambulating with wheel chair, c/o chest pain. pt is polish speaking with family at the bed translating and providing medical history. pt denies currently having chest pain. pt endorses right sided ABD pain that radiates to right flank, and having the feeling that it is "hard to breath" for three days. Abd soft but tender when palpated. pt states when her abd is palpated she feels the pain in her right flank. 88Y Female A&Ox(-), PMH (-), PSH (-), presents to the ED, ambulating with wheel chair, c/o chest pain. pt is polish speaking with family at the bed translating and providing medical history. pt denies currently having chest pain. pt endorses right sided ABD pain that radiates to right flank, and having the feeling that it is "hard to breath" for three days. Abd soft but tender when palpated. pt states when her abd is palpated she feels the pain in her right flank.  skin is warm, dry, and color is normal for race. 20g IV placed in left AC. dressing dry clean and intact. bed is in lowest position and call bell is within reach. Pt son is at bedside. 88Y Female A&Ox3 but poor historian, PM DVT in 2021, no PSH, presents to the ED, ambulating with wheel chair, c/o chest pain. pt is polish speaking with family at the bed translating and providing medical history. pt denies currently having chest pain. pt endorses right sided ABD pain that radiates to right flank, and having the feeling that it is "hard to breath" for three days. Abd soft but tender when palpated. pt states when her abd is palpated she feels the pain in her right flank.  skin is warm, dry, and color is normal for race. 20g IV placed in left AC. dressing dry clean and intact. bed is in lowest position and call bell is within reach. Pt son is at bedside.

## 2022-07-31 NOTE — ED PROVIDER NOTE - INTERPRETATION
EKG reviewed for rate, rhythm, axis, intervals and segments, including QRS morphology, P wave appearance T wave appearance, MD interval, and QT interval.  I find the EKG to be unremarkable in all of these regards except as follows: sinus tach

## 2022-07-31 NOTE — ED PROVIDER NOTE - PHYSICAL EXAMINATION
Const: Well-nourished, Well-developed, appearing stated age.  Eyes: no conjunctival injection, and symmetrical lids.  HEENT: Head NCAT, no lesions. Atraumatic external nose and ears. Moist MM.  Neck: Symmetric, trachea midline.   CVS: +S1/S2,  RESP: Unlabored respiratory effort. Clear to auscultation bilaterally.  GI: RUQ ttp. No CVA tenderness b/l.   MSK: Normocephalic/Atraumatic, Lower Extremities w/o calf tenderness or edema b/l.   Skin: Warm, dry and intact.   Neuro: CNs II-XII grossly intact. Motor & Sensation grossly intact.  Psych: Awake, Alert, & Oriented (AAO) x3. Appropriate mood and affect.

## 2022-08-01 LAB
24R-OH-CALCIDIOL SERPL-MCNC: 47.2 NG/ML — SIGNIFICANT CHANGE UP (ref 30–80)
ALBUMIN SERPL ELPH-MCNC: 3.5 G/DL — SIGNIFICANT CHANGE UP (ref 3.3–5)
ALP SERPL-CCNC: 68 U/L — SIGNIFICANT CHANGE UP (ref 40–120)
ALT FLD-CCNC: 12 U/L — SIGNIFICANT CHANGE UP (ref 10–45)
ANION GAP SERPL CALC-SCNC: 14 MMOL/L — SIGNIFICANT CHANGE UP (ref 5–17)
APPEARANCE UR: CLEAR — SIGNIFICANT CHANGE UP
APTT BLD: 62 SEC — HIGH (ref 27.5–35.5)
APTT BLD: 73.9 SEC — HIGH (ref 27.5–35.5)
APTT BLD: 87.9 SEC — HIGH (ref 27.5–35.5)
APTT BLD: 99.8 SEC — HIGH (ref 27.5–35.5)
AST SERPL-CCNC: 16 U/L — SIGNIFICANT CHANGE UP (ref 10–40)
BACTERIA # UR AUTO: NEGATIVE — SIGNIFICANT CHANGE UP
BASOPHILS # BLD AUTO: 0.02 K/UL — SIGNIFICANT CHANGE UP (ref 0–0.2)
BASOPHILS NFR BLD AUTO: 0.2 % — SIGNIFICANT CHANGE UP (ref 0–2)
BCA 255 TISS QL IMSTN: 10.4 U/ML — SIGNIFICANT CHANGE UP
BILIRUB SERPL-MCNC: 0.6 MG/DL — SIGNIFICANT CHANGE UP (ref 0.2–1.2)
BILIRUB UR-MCNC: NEGATIVE — SIGNIFICANT CHANGE UP
BUN SERPL-MCNC: 18 MG/DL — SIGNIFICANT CHANGE UP (ref 7–23)
CALCIUM SERPL-MCNC: 9 MG/DL — SIGNIFICANT CHANGE UP (ref 8.4–10.5)
CANCER AG19-9 SERPL-ACNC: 45 U/ML — HIGH
CANCER AG27-29 SERPL-ACNC: 13.1 U/ML — SIGNIFICANT CHANGE UP (ref 0–38.6)
CEA SERPL-MCNC: 2.2 NG/ML — SIGNIFICANT CHANGE UP (ref 0–3.8)
CHLORIDE SERPL-SCNC: 100 MMOL/L — SIGNIFICANT CHANGE UP (ref 96–108)
CHOLEST SERPL-MCNC: 251 MG/DL — HIGH
CO2 SERPL-SCNC: 19 MMOL/L — LOW (ref 22–31)
COLOR SPEC: YELLOW — SIGNIFICANT CHANGE UP
CREAT SERPL-MCNC: 0.74 MG/DL — SIGNIFICANT CHANGE UP (ref 0.5–1.3)
DIFF PNL FLD: ABNORMAL
EGFR: 78 ML/MIN/1.73M2 — SIGNIFICANT CHANGE UP
EOSINOPHIL # BLD AUTO: 0 K/UL — SIGNIFICANT CHANGE UP (ref 0–0.5)
EOSINOPHIL NFR BLD AUTO: 0 % — SIGNIFICANT CHANGE UP (ref 0–6)
EPI CELLS # UR: 1 /HPF — SIGNIFICANT CHANGE UP
GLUCOSE BLDC GLUCOMTR-MCNC: 171 MG/DL — HIGH (ref 70–99)
GLUCOSE SERPL-MCNC: 137 MG/DL — HIGH (ref 70–99)
GLUCOSE UR QL: NEGATIVE — SIGNIFICANT CHANGE UP
HCT VFR BLD CALC: 39.3 % — SIGNIFICANT CHANGE UP (ref 34.5–45)
HCT VFR BLD CALC: 39.9 % — SIGNIFICANT CHANGE UP (ref 34.5–45)
HDLC SERPL-MCNC: 61 MG/DL — SIGNIFICANT CHANGE UP
HGB BLD-MCNC: 13.3 G/DL — SIGNIFICANT CHANGE UP (ref 11.5–15.5)
HGB BLD-MCNC: 13.8 G/DL — SIGNIFICANT CHANGE UP (ref 11.5–15.5)
HYALINE CASTS # UR AUTO: 9 /LPF — HIGH (ref 0–2)
IMM GRANULOCYTES NFR BLD AUTO: 0.5 % — SIGNIFICANT CHANGE UP (ref 0–1.5)
KETONES UR-MCNC: NEGATIVE — SIGNIFICANT CHANGE UP
LEUKOCYTE ESTERASE UR-ACNC: ABNORMAL
LIPID PNL WITH DIRECT LDL SERPL: 174 MG/DL — HIGH
LYMPHOCYTES # BLD AUTO: 0.84 K/UL — LOW (ref 1–3.3)
LYMPHOCYTES # BLD AUTO: 7.6 % — LOW (ref 13–44)
MCHC RBC-ENTMCNC: 29.4 PG — SIGNIFICANT CHANGE UP (ref 27–34)
MCHC RBC-ENTMCNC: 29.9 PG — SIGNIFICANT CHANGE UP (ref 27–34)
MCHC RBC-ENTMCNC: 33.8 GM/DL — SIGNIFICANT CHANGE UP (ref 32–36)
MCHC RBC-ENTMCNC: 34.6 GM/DL — SIGNIFICANT CHANGE UP (ref 32–36)
MCV RBC AUTO: 86.4 FL — SIGNIFICANT CHANGE UP (ref 80–100)
MCV RBC AUTO: 86.8 FL — SIGNIFICANT CHANGE UP (ref 80–100)
MONOCYTES # BLD AUTO: 0.98 K/UL — HIGH (ref 0–0.9)
MONOCYTES NFR BLD AUTO: 8.9 % — SIGNIFICANT CHANGE UP (ref 2–14)
NEUTROPHILS # BLD AUTO: 9.13 K/UL — HIGH (ref 1.8–7.4)
NEUTROPHILS NFR BLD AUTO: 82.8 % — HIGH (ref 43–77)
NITRITE UR-MCNC: NEGATIVE — SIGNIFICANT CHANGE UP
NON HDL CHOLESTEROL: 190 MG/DL — HIGH
NRBC # BLD: 0 /100 WBCS — SIGNIFICANT CHANGE UP (ref 0–0)
NRBC # BLD: 0 /100 WBCS — SIGNIFICANT CHANGE UP (ref 0–0)
PH UR: 6 — SIGNIFICANT CHANGE UP (ref 5–8)
PLATELET # BLD AUTO: 199 K/UL — SIGNIFICANT CHANGE UP (ref 150–400)
PLATELET # BLD AUTO: 212 K/UL — SIGNIFICANT CHANGE UP (ref 150–400)
POTASSIUM SERPL-MCNC: 3.8 MMOL/L — SIGNIFICANT CHANGE UP (ref 3.5–5.3)
POTASSIUM SERPL-SCNC: 3.8 MMOL/L — SIGNIFICANT CHANGE UP (ref 3.5–5.3)
PROT SERPL-MCNC: 6.9 G/DL — SIGNIFICANT CHANGE UP (ref 6–8.3)
PROT UR-MCNC: ABNORMAL
RBC # BLD: 4.53 M/UL — SIGNIFICANT CHANGE UP (ref 3.8–5.2)
RBC # BLD: 4.62 M/UL — SIGNIFICANT CHANGE UP (ref 3.8–5.2)
RBC # FLD: 13.3 % — SIGNIFICANT CHANGE UP (ref 10.3–14.5)
RBC # FLD: 13.3 % — SIGNIFICANT CHANGE UP (ref 10.3–14.5)
RBC CASTS # UR COMP ASSIST: 14 /HPF — HIGH (ref 0–4)
SODIUM SERPL-SCNC: 133 MMOL/L — LOW (ref 135–145)
SP GR SPEC: 1.03 — HIGH (ref 1.01–1.02)
TRIGL SERPL-MCNC: 80 MG/DL — SIGNIFICANT CHANGE UP
TSH SERPL-MCNC: 1.87 UIU/ML — SIGNIFICANT CHANGE UP (ref 0.27–4.2)
UROBILINOGEN FLD QL: NEGATIVE — SIGNIFICANT CHANGE UP
WBC # BLD: 11.02 K/UL — HIGH (ref 3.8–10.5)
WBC # BLD: 11.56 K/UL — HIGH (ref 3.8–10.5)
WBC # FLD AUTO: 11.02 K/UL — HIGH (ref 3.8–10.5)
WBC # FLD AUTO: 11.56 K/UL — HIGH (ref 3.8–10.5)
WBC UR QL: 45 /HPF — HIGH (ref 0–5)

## 2022-08-01 PROCEDURE — 93970 EXTREMITY STUDY: CPT | Mod: 26

## 2022-08-01 PROCEDURE — 99223 1ST HOSP IP/OBS HIGH 75: CPT | Mod: 25

## 2022-08-01 PROCEDURE — 93306 TTE W/DOPPLER COMPLETE: CPT | Mod: 26

## 2022-08-01 RX ORDER — KETOROLAC TROMETHAMINE 30 MG/ML
10 SYRINGE (ML) INJECTION DAILY
Refills: 0 | Status: DISCONTINUED | OUTPATIENT
Start: 2022-08-01 | End: 2022-08-03

## 2022-08-01 RX ORDER — PANTOPRAZOLE SODIUM 20 MG/1
40 TABLET, DELAYED RELEASE ORAL
Refills: 0 | Status: DISCONTINUED | OUTPATIENT
Start: 2022-08-01 | End: 2022-08-04

## 2022-08-01 RX ADMIN — HEPARIN SODIUM 1000 UNIT(S)/HR: 5000 INJECTION INTRAVENOUS; SUBCUTANEOUS at 17:16

## 2022-08-01 RX ADMIN — DONEPEZIL HYDROCHLORIDE 5 MILLIGRAM(S): 10 TABLET, FILM COATED ORAL at 21:48

## 2022-08-01 RX ADMIN — Medication 25 MILLIGRAM(S): at 05:37

## 2022-08-01 RX ADMIN — HEPARIN SODIUM 1000 UNIT(S)/HR: 5000 INJECTION INTRAVENOUS; SUBCUTANEOUS at 21:50

## 2022-08-01 RX ADMIN — Medication 81 MILLIGRAM(S): at 13:04

## 2022-08-01 RX ADMIN — ATORVASTATIN CALCIUM 20 MILLIGRAM(S): 80 TABLET, FILM COATED ORAL at 21:45

## 2022-08-01 RX ADMIN — HEPARIN SODIUM 1100 UNIT(S)/HR: 5000 INJECTION INTRAVENOUS; SUBCUTANEOUS at 00:50

## 2022-08-01 RX ADMIN — Medication 650 MILLIGRAM(S): at 05:37

## 2022-08-01 RX ADMIN — Medication 25 MILLIGRAM(S): at 17:16

## 2022-08-01 RX ADMIN — HEPARIN SODIUM 1100 UNIT(S)/HR: 5000 INJECTION INTRAVENOUS; SUBCUTANEOUS at 09:20

## 2022-08-01 RX ADMIN — Medication 650 MILLIGRAM(S): at 14:06

## 2022-08-01 RX ADMIN — Medication 650 MILLIGRAM(S): at 16:31

## 2022-08-01 RX ADMIN — HEPARIN SODIUM 1000 UNIT(S)/HR: 5000 INJECTION INTRAVENOUS; SUBCUTANEOUS at 09:50

## 2022-08-01 RX ADMIN — HEPARIN SODIUM 1000 UNIT(S)/HR: 5000 INJECTION INTRAVENOUS; SUBCUTANEOUS at 13:03

## 2022-08-01 RX ADMIN — HEPARIN SODIUM 1000 UNIT(S)/HR: 5000 INJECTION INTRAVENOUS; SUBCUTANEOUS at 19:04

## 2022-08-01 NOTE — PROGRESS NOTE ADULT - ASSESSMENT
_________________________________________________________________________________________  ========>>  M E D I C A L   A T T E N D I N G    F O L L O W  U P  N O T E  <<=========  -----------------------------------------------------------------------------------------------------    - Patient seen and examined by me earlier today.   - In summary,  SIMON NOE is a 88y year old woman admitted with VTE  - Patient today overall doing ok, comfortable, eating fairly      + c/o sharp pains in chest mostly Rt sided.. worse with breathing     ==================>> REVIEW OF SYSTEM <<=================    GEN: no fever, no chills, as above   RESP: no SOB, no cough, no sputum  CVS: no chest pain, no palpitations  GI: no abdominal pain, no nausea, no constipation, no diarrhea  : no dysuria, no frequency, no hematuria  Neuro: no headache, no dizziness  Derm : no itching, no rash    ==================>> PHYSICAL EXAM <<=================    GEN: A&O X 3 , NAD , somewhat uncomfortable, pleasant, calm , just received tylenol   HEENT: NCAT, PERRL, MMM, hearing intact, off O2  Neck: supple , no JVD appreciated  CVS: S1S2 , regular , No M/R/G appreciated  PULM: CTA B/L,  limited exam as not taking deep breaths   ABD.: soft. non tender, non distended,  bowel sounds present  Extrem: intact pulses , no edema   PSYCH : normal mood,  not anxious                            ( Note Written / Date of service :  08-01-22 )    ==================>> MEDICATIONS <<====================    MEDICATIONS  (STANDING):  aspirin enteric coated 81 milliGRAM(s) Oral daily  atorvastatin 20 milliGRAM(s) Oral at bedtime  donepezil 5 milliGRAM(s) Oral at bedtime  heparin  Infusion.  Unit(s)/Hr (11 mL/Hr) IV Continuous <Continuous>  methimazole 2.5 milliGRAM(s) Oral daily  metoprolol tartrate 25 milliGRAM(s) Oral two times a day    MEDICATIONS  (PRN):  acetaminophen     Tablet .. 650 milliGRAM(s) Oral every 6 hours PRN Temp greater or equal to 38C (100.4F), Mild Pain (1 - 3)  aluminum hydroxide/magnesium hydroxide/simethicone Suspension 30 milliLiter(s) Oral every 4 hours PRN Dyspepsia  heparin   Injectable 4500 Unit(s) IV Push every 6 hours PRN For aPTT less than 40  heparin   Injectable 2000 Unit(s) IV Push every 6 hours PRN For aPTT between 40 - 57  ketorolac 10 milliGRAM(s) Oral daily PRN Severe Pain (7 - 10)  melatonin 3 milliGRAM(s) Oral at bedtime PRN Insomnia  ondansetron Injectable 4 milliGRAM(s) IV Push every 8 hours PRN Nausea and/or Vomiting    ___________  Active diet:  Diet, Regular:   DASH/TLC Sodium & Cholesterol Restricted (DASH)  Supplement Feeding Modality:  Oral  Ensure Enlive Cans or Servings Per Day:  2       Frequency:  Two Times a day  ___________________    ==================>> VITAL SIGNS <<==================    T(C): 36.5 (08-01-22 @ 08:26), Max: 37.6 (07-31-22 @ 16:57)  HR: 58 (08-01-22 @ 08:26) (58 - 99)  BP: 115/60 (08-01-22 @ 07:06) (113/84 - 140/76)  BP(mean): 82 (08-01-22 @ 03:00)  RR: 18 (08-01-22 @ 08:26) (18 - 37)  SpO2: 95% (08-01-22 @ 08:26) (95% - 97%)     CAPILLARY BLOOD GLUCOSE  POCT Blood Glucose.: 171 mg/dL (01 Aug 2022 12:39)       ==================>> LAB AND IMAGING <<==================                        13.3   11.02 )-----------( 212      ( 01 Aug 2022 07:51 )             39.3        08-01    133<L>  |  100  |  18  ----------------------------<  137<H>  3.8   |  19<L>  |  0.74    Sodium:   133  <==, 136  <==    Ca    9.0      01 Aug 2022 07:51  Mg     1.6     07-31    TPro  6.9  /  Alb  3.5  /  TBili  0.6  /  DBili  x   /  AST  16  /  ALT  12  /  AlkPhos  68  08-01    PTT - ( 01 Aug 2022 09:03 )  PTT:99.8 sec              TSH:      1.87   (08-01-22)           Lipid profile:  (08-01-22)     Total: 251     LDL  : (p)     HDL  :61     TG   :80    cancer markers noted  CA 19-9 slightly elevated   CEA and others negative  /  WNL     < from: Transthoracic Echocardiogram (08.01.22 @ 13:44) >  Conclusions:  1. Calcified trileaflet aortic valve with normal opening.  Moderate aortic regurgitation.  2. Increased relative wall thickness with normal left  ventricular mass index, consistent with concentric left ventricular remodeling.  3. Hyperdynamic left ventricular systolic function.  4. Normal right ventricular size and function.  5. Normal tricuspid valve. Moderate tricuspid regurgitation.  6. Estimated pulmonary artery systolic pressure equals 39  mm Hg, assuming right atrial pressure equals 8 mm Hg,  consistent with borderline pulmonary pressures.  *** No previous Echo exam.  < end of copied text >    < from: CT Angio Chest PE Protocol w/ IV Cont (07.31.22 @ 13:47) >  IMPRESSION:  Acute bilateral pulmonary embolism, more prominent in the right lower   lobe. Right heart strain. Patchy airspace consolidation in the right   lower lobe and right middle lobe which likely represent   atelectasis/pneumonia, however pulmonary infarct cannot be excluded in   the setting of acute pulmonary embolism. Trace right pleural effusion.    Acute DVT left femoral vein, possible acute DVT right femoral vein.    Ulcerated plaques of the aortic arch and proximal descending thoracic   aorta.  1.9 cm focal outpouching of contrast into the left lateral aortic   wall at the aortic hiatus consistent with a penetrating ulcer.  < end of copied text >    ___________________________________________________________________________________  ===============>>  A S S E S S M E N T   A N D   P L A N <<===============  ------------------------------------------------------------------------------------------    · Assessment	  Patient is a 89 yo Female ( lives independently, normal mental status per Dtr)  with past medical history of arrythmia (unknown which kind) and hyperthyroidism, prior DVT and HLD, who presents to ED with few days of ruq abdominal pain that radiates to back and shortness of breath, no cp, n/v/f/c.   pt in ED diagnosed with acute PE as bellow and started on Heparin drip  pt otherwise comfortable, on minimal O2   pt also incidentally on CT found to have an aortic ulcerated plaque as colby, vascular consulted and noted   per pt's Dtr pt had a DVT last year, was on Xarelto but pt had reportedly stopped taking it ? months ago as was feeling better..        Problem/Plan - 1:  ·  Problem: Venous thromboembolism (VTE).   ·  Plan: pt with proximal DVTs and bilateral PEs  pt was supposed to be on Xarelto but stopped taking it per Dtr..   pt on heparin drip: monitor   likely will transition back to Xarelto or other NOAcs soon      will need life long AC ( D/W Dtr)   Echo as above   tele   cancer markers noted   monitor vitals labs.  pain mgmt as needed     Problem/Plan - 2:  ·  Problem: Hyperthyroidism.   ·  Plan: continue tapazol home dose  TSH.  1.87    Problem/Plan - 3:  ·  Problem: Cardiac arrhythmia.   ·  Plan: likely AF but family not sure  continue metoprolol  continue AC  cardio appreciated  tele.    Problem/Plan - 4:  ·  Problem: HLD (hyperlipidemia).   ·  Plan: Continue home medications and monitor.  lipid profile. noted     Problem/Plan - 5:  ·  Problem: hyponatremia   monitor closely for developing SIADH  encourage PO intake  monitor BMP     --------------------------------------------  Case discussed with pt's grand Dtr at bedside   Education given on findings and plan of care  ___________________________  H. DORA Collins.  Pager: [phone]

## 2022-08-01 NOTE — ED ADULT NURSE REASSESSMENT NOTE - NS ED NURSE REASSESS COMMENT FT1
medicated for c/o pain to right rib area with 650  mg Tylenol po; pt is alert and cooperative; on cardiac monitor and continuous pulse ox.

## 2022-08-01 NOTE — ED ADULT NURSE REASSESSMENT NOTE - NS ED NURSE REASSESS COMMENT FT1
prima fit was dislodged and sheet/pad wet; pt turned, changed, repositioned; replaced primafit and placed new diaper; skin intact.

## 2022-08-01 NOTE — ED ADULT NURSE REASSESSMENT NOTE - NS ED NURSE REASSESS COMMENT FT1
Received report from night rn, pt resting comfortably, on heparin gtt, repeat coags sent at change of shift. No new complaints. VSS, pending bed

## 2022-08-02 LAB
APTT BLD: 64.3 SEC — HIGH (ref 27.5–35.5)
HCT VFR BLD CALC: 40.6 % — SIGNIFICANT CHANGE UP (ref 34.5–45)
HGB BLD-MCNC: 13.9 G/DL — SIGNIFICANT CHANGE UP (ref 11.5–15.5)
MCHC RBC-ENTMCNC: 29.8 PG — SIGNIFICANT CHANGE UP (ref 27–34)
MCHC RBC-ENTMCNC: 34.2 GM/DL — SIGNIFICANT CHANGE UP (ref 32–36)
MCV RBC AUTO: 86.9 FL — SIGNIFICANT CHANGE UP (ref 80–100)
NRBC # BLD: 0 /100 WBCS — SIGNIFICANT CHANGE UP (ref 0–0)
PLATELET # BLD AUTO: 236 K/UL — SIGNIFICANT CHANGE UP (ref 150–400)
RBC # BLD: 4.67 M/UL — SIGNIFICANT CHANGE UP (ref 3.8–5.2)
RBC # FLD: 13.2 % — SIGNIFICANT CHANGE UP (ref 10.3–14.5)
WBC # BLD: 9.87 K/UL — SIGNIFICANT CHANGE UP (ref 3.8–10.5)
WBC # FLD AUTO: 9.87 K/UL — SIGNIFICANT CHANGE UP (ref 3.8–10.5)

## 2022-08-02 RX ORDER — APIXABAN 2.5 MG/1
10 TABLET, FILM COATED ORAL EVERY 12 HOURS
Refills: 0 | Status: DISCONTINUED | OUTPATIENT
Start: 2022-08-02 | End: 2022-08-04

## 2022-08-02 RX ADMIN — ATORVASTATIN CALCIUM 20 MILLIGRAM(S): 80 TABLET, FILM COATED ORAL at 23:37

## 2022-08-02 RX ADMIN — HEPARIN SODIUM 1000 UNIT(S)/HR: 5000 INJECTION INTRAVENOUS; SUBCUTANEOUS at 07:27

## 2022-08-02 RX ADMIN — Medication 81 MILLIGRAM(S): at 12:51

## 2022-08-02 RX ADMIN — Medication 25 MILLIGRAM(S): at 17:02

## 2022-08-02 RX ADMIN — PANTOPRAZOLE SODIUM 40 MILLIGRAM(S): 20 TABLET, DELAYED RELEASE ORAL at 05:35

## 2022-08-02 RX ADMIN — Medication 25 MILLIGRAM(S): at 05:34

## 2022-08-02 RX ADMIN — APIXABAN 10 MILLIGRAM(S): 2.5 TABLET, FILM COATED ORAL at 17:02

## 2022-08-02 RX ADMIN — Medication 1 TABLET(S): at 12:49

## 2022-08-02 RX ADMIN — DONEPEZIL HYDROCHLORIDE 5 MILLIGRAM(S): 10 TABLET, FILM COATED ORAL at 23:37

## 2022-08-02 RX ADMIN — Medication 1 TABLET(S): at 23:37

## 2022-08-02 NOTE — PATIENT PROFILE ADULT - FALL HARM RISK - HARM RISK INTERVENTIONS

## 2022-08-02 NOTE — PROVIDER CONTACT NOTE (OTHER) - ACTION/TREATMENT ORDERED:
NP notified, as per NP, give 10mg of Eliquis at 1700, and give PRN pain meds. Pending 10mg of Toradol from pharmacy.

## 2022-08-02 NOTE — PROGRESS NOTE ADULT - ASSESSMENT
_________________________________________________________________________________________  ========>>  M E D I C A L   A T T E N D I N G    F O L L O W  U P  N O T E  <<=========  -----------------------------------------------------------------------------------------------------    - Patient seen and examined by me earlier today.   - In summary,  SIMON NOE is a 88y year old woman admitted with VTE  - Patient today overall doing ok, comfortable, eating fairly        sharp pains much improved today, pt more comfortable     ==================>> REVIEW OF SYSTEM <<=================    GEN: no fever, no chills, as above   RESP: no SOB, no cough, no sputum  CVS: no chest pain, no palpitations, as above: much improved   GI: no abdominal pain, no nausea, appetite fair- poor   : no dysuria, no frequency, no hematuria  Neuro: no headache, no dizziness  Derm : no itching, no rash    ==================>> PHYSICAL EXAM <<=================    GEN: A&O X 3 , NAD , comfortable, pleasant, calm in bed    HEENT: NCAT, PERRL, MMM, hearing intact, off O2  Neck: supple , no JVD appreciated  CVS: S1S2 , regular , No M/R/G appreciated  PULM: CTA B/L,  limited exam as not taking deep breaths   ABD.: soft. non tender, non distended,  bowel sounds present  Extrem: intact pulses , no edema   PSYCH : normal mood,  not anxious                             ( Note written / Date of service 22 )    ==================>> MEDICATIONS <<====================    amoxicillin  500 milliGRAM(s)/clavulanate 1 Tablet(s) Oral every 12 hours  apixaban 10 milliGRAM(s) Oral every 12 hours  aspirin enteric coated 81 milliGRAM(s) Oral daily  atorvastatin 20 milliGRAM(s) Oral at bedtime  donepezil 5 milliGRAM(s) Oral at bedtime  methimazole 2.5 milliGRAM(s) Oral daily  metoprolol tartrate 25 milliGRAM(s) Oral two times a day  pantoprazole    Tablet 40 milliGRAM(s) Oral before breakfast    MEDICATIONS  (PRN):  acetaminophen     Tablet .. 650 milliGRAM(s) Oral every 6 hours PRN Temp greater or equal to 38C (100.4F), Mild Pain (1 - 3)  aluminum hydroxide/magnesium hydroxide/simethicone Suspension 30 milliLiter(s) Oral every 4 hours PRN Dyspepsia  ketorolac 10 milliGRAM(s) Oral daily PRN Severe Pain (7 - 10)  melatonin 3 milliGRAM(s) Oral at bedtime PRN Insomnia  ondansetron Injectable 4 milliGRAM(s) IV Push every 8 hours PRN Nausea and/or Vomiting    ___________  Active diet:  Diet, Regular:   DASH/TLC Sodium & Cholesterol Restricted (DASH)  Supplement Feeding Modality:  Oral  Ensure Enlive Cans or Servings Per Day:  2       Frequency:  Two Times a day  ___________________    ==================>> VITAL SIGNS <<==================  Height (cm): 162.6  Weight (kg): 55.8  BMI (kg/m2): 21.1  Vital Signs Last 24 HrsT(C): 37.1 (22 @ 13:30)  T(F): 98.7 (22 @ 13:30), Max: 98.7 (22 @ 13:30)  HR: 77 (22 @ 13:30) (67 - 91)  BP: 134/69 (22 @ 13:30)  RR: 18 (22 @ 13:30) (17 - 18)  SpO2: 93% (22 @ 13:30) (93% - 95%)       ==================>> LAB AND IMAGING <<==================                        13.9   9.87  )-----------( 236      ( 02 Aug 2022 07:41 )             40.6            133<L>  |  100  |  18  ----------------------------<  137<H>  3.8   |  19<L>  |  0.74    Ca    9.0      01 Aug 2022 07:51    TPro  6.9  /  Alb  3.5  /  TBili  0.6  /  DBili  x   /  AST  16  /  ALT  12  /  AlkPhos  68      WBC count:   9.87 <<== ,  11.02 <<== ,  11.56 <<== ,  10.22 <<==   Hemoglobin:   13.9 <<==,  13.3 <<==,  13.8 <<==,  14.1 <<==  platelets:  236 <==, 212 <==, 199 <==, 191 <==    Creatinine:  0.74  <<==, 0.90  <<==  Sodium:   133  <==, 136  <==       AST:          16 <== , 15 <==      ALT:        12  <== , 15  <==      AP:        68  <=, 74  <=     Bili:        0.6  <=, 0.8  <=    cancer markers noted  CA 19-9 slightly elevated   CEA and others negative  /  WNL     Urinalysis Basic - ( 01 Aug 2022 23:06 )  Color: Yellow / Appearance: Clear / S.030 / pH: x  Gluc: x / Ketone: Negative  / Bili: Negative / Urobili: Negative   Blood: x / Protein: 30 mg/dL / Nitrite: Negative   Leuk Esterase: Large / RBC: 14 /hpf / WBC 45 /HPF   Sq Epi: x / Non Sq Epi: 1 /hpf / Bacteria: Negative      < from: Transthoracic Echocardiogram (22 @ 13:44) >  Conclusions:  1. Calcified trileaflet aortic valve with normal opening.  Moderate aortic regurgitation.  2. Increased relative wall thickness with normal left  ventricular mass index, consistent with concentric left ventricular remodeling.  3. Hyperdynamic left ventricular systolic function.  4. Normal right ventricular size and function.  5. Normal tricuspid valve. Moderate tricuspid regurgitation.  6. Estimated pulmonary artery systolic pressure equals 39  mm Hg, assuming right atrial pressure equals 8 mm Hg,  consistent with borderline pulmonary pressures.  *** No previous Echo exam.  < end of copied text >    < from: CT Angio Chest PE Protocol w/ IV Cont (22 @ 13:47) >  IMPRESSION:  Acute bilateral pulmonary embolism, more prominent in the right lower   lobe. Right heart strain. Patchy airspace consolidation in the right   lower lobe and right middle lobe which likely represent   atelectasis/pneumonia, however pulmonary infarct cannot be excluded in   the setting of acute pulmonary embolism. Trace right pleural effusion.    Acute DVT left femoral vein, possible acute DVT right femoral vein.    Ulcerated plaques of the aortic arch and proximal descending thoracic   aorta.  1.9 cm focal outpouching of contrast into the left lateral aortic   wall at the aortic hiatus consistent with a penetrating ulcer.  < end of copied text >    ___________________________________________________________________________________  ===============>>  A S S E S S M E N T   A N D   P L A N <<===============  ------------------------------------------------------------------------------------------    · Assessment	  Patient is a 87 yo Female ( lives independently, normal mental status per Dtr)  with past medical history of arrythmia (unknown which kind) and hyperthyroidism, prior DVT and HLD, who presents to ED with few days of ruq abdominal pain that radiates to back and shortness of breath, no cp, n/v/f/c.   pt in ED diagnosed with acute PE as bellow and started on Heparin drip  pt otherwise comfortable, on minimal O2   pt also incidentally on CT found to have an aortic ulcerated plaque as bellow, vascular consulted and noted   per pt's Dtr pt had a DVT last year, was on Xarelto but pt had reportedly stopped taking it ? months ago as was feeling better..        Problem/Plan - 1:  ·  Problem: Venous thromboembolism (VTE).   ·  Plan: pt with proximal DVTs and bilateral PEs  heparin drip >> eliquis today      will need life long AC ( D/W grand Dtr at bedside again > who translated for pt )   Echo as above   tele   cancer markers noted   monitor vitals labs.  pain mgmt as needed     Problem/Plan - 2:  ·  Problem: UTI  positive U/A  started on Oral abx in prep for possible DC soon    Problem/Plan - 3:  ·  Problem: Hyperthyroidism.   ·  Plan: continue tapazol home dose  TSH.  1.87    Problem/Plan - 4:  ·  Problem: Cardiac arrhythmia.   ·  Plan: likely AF but family not sure  continue metoprolol  continue AC  cardio appreciated  tele.    Problem/Plan - 5:  ·  Problem: HLD (hyperlipidemia).   ·  Plan: Continue home medications and monitor.  lipid profile. noted     Problem/Plan - 6:  ·  Problem: hyponatremia   monitor closely for developing SIADH  encourage PO intake >> encouraged again   monitor BMP     possible DC planing next 24 to 48 hrs if stable     --------------------------------------------  Case discussed with pt's grand Dtr at bedside   Education given on findings and plan of care  ___________________________  H. DORA Collins.  Pager: [phone]       _________________________________________________________________________________________  ========>>  M E D I C A L   A T T E N D I N G    F O L L O W  U P  N O T E  <<=========  -----------------------------------------------------------------------------------------------------    - Patient seen and examined by me earlier today.   - In summary,  SIMON NOE is a 88y year old woman admitted with VTE  - Patient today overall doing ok, comfortable, eating fairly        sharp pains much improved today, pt more comfortable     ==================>> REVIEW OF SYSTEM <<=================    GEN: no fever, no chills, as above   RESP: no SOB, no cough, no sputum  CVS: no chest pain, no palpitations, as above: much improved   GI: no abdominal pain, no nausea, appetite fair- poor   : no dysuria, no frequency, no hematuria  Neuro: no headache, no dizziness  Derm : no itching, no rash    ==================>> PHYSICAL EXAM <<=================    GEN: A&O X 3 , NAD , comfortable, pleasant, calm in bed    HEENT: NCAT, PERRL, MMM, hearing intact, off O2  Neck: supple , no JVD appreciated  CVS: S1S2 , regular , No M/R/G appreciated  PULM: CTA B/L,  limited exam as not taking deep breaths   ABD.: soft. non tender, non distended,  bowel sounds present  Extrem: intact pulses , no edema   PSYCH : normal mood,  not anxious                             ( Note written / Date of service 22 )    ==================>> MEDICATIONS <<====================    amoxicillin  500 milliGRAM(s)/clavulanate 1 Tablet(s) Oral every 12 hours  apixaban 10 milliGRAM(s) Oral every 12 hours  aspirin enteric coated 81 milliGRAM(s) Oral daily  atorvastatin 20 milliGRAM(s) Oral at bedtime  donepezil 5 milliGRAM(s) Oral at bedtime  methimazole 2.5 milliGRAM(s) Oral daily  metoprolol tartrate 25 milliGRAM(s) Oral two times a day  pantoprazole    Tablet 40 milliGRAM(s) Oral before breakfast    MEDICATIONS  (PRN):  acetaminophen     Tablet .. 650 milliGRAM(s) Oral every 6 hours PRN Temp greater or equal to 38C (100.4F), Mild Pain (1 - 3)  aluminum hydroxide/magnesium hydroxide/simethicone Suspension 30 milliLiter(s) Oral every 4 hours PRN Dyspepsia  ketorolac 10 milliGRAM(s) Oral daily PRN Severe Pain (7 - 10)  melatonin 3 milliGRAM(s) Oral at bedtime PRN Insomnia  ondansetron Injectable 4 milliGRAM(s) IV Push every 8 hours PRN Nausea and/or Vomiting    ___________  Active diet:  Diet, Regular:   DASH/TLC Sodium & Cholesterol Restricted (DASH)  Supplement Feeding Modality:  Oral  Ensure Enlive Cans or Servings Per Day:  2       Frequency:  Two Times a day  ___________________    ==================>> VITAL SIGNS <<==================  Height (cm): 162.6  Weight (kg): 55.8  BMI (kg/m2): 21.1  Vital Signs Last 24 HrsT(C): 37.1 (22 @ 13:30)  T(F): 98.7 (22 @ 13:30), Max: 98.7 (22 @ 13:30)  HR: 77 (22 @ 13:30) (67 - 91)  BP: 134/69 (22 @ 13:30)  RR: 18 (22 @ 13:30) (17 - 18)  SpO2: 93% (22 @ 13:30) (93% - 95%)       ==================>> LAB AND IMAGING <<==================                        13.9   9.87  )-----------( 236      ( 02 Aug 2022 07:41 )             40.6            133<L>  |  100  |  18  ----------------------------<  137<H>  3.8   |  19<L>  |  0.74    Ca    9.0      01 Aug 2022 07:51    TPro  6.9  /  Alb  3.5  /  TBili  0.6  /  DBili  x   /  AST  16  /  ALT  12  /  AlkPhos  68      WBC count:   9.87 <<== ,  11.02 <<== ,  11.56 <<== ,  10.22 <<==   Hemoglobin:   13.9 <<==,  13.3 <<==,  13.8 <<==,  14.1 <<==  platelets:  236 <==, 212 <==, 199 <==, 191 <==    Creatinine:  0.74  <<==, 0.90  <<==  Sodium:   133  <==, 136  <==       AST:          16 <== , 15 <==      ALT:        12  <== , 15  <==      AP:        68  <=, 74  <=     Bili:        0.6  <=, 0.8  <=    cancer markers noted  CA 19-9 slightly elevated   CEA and others negative  /  WNL     Urinalysis Basic - ( 01 Aug 2022 23:06 )  Color: Yellow / Appearance: Clear / S.030 / pH: x  Gluc: x / Ketone: Negative  / Bili: Negative / Urobili: Negative   Blood: x / Protein: 30 mg/dL / Nitrite: Negative   Leuk Esterase: Large / RBC: 14 /hpf / WBC 45 /HPF   Sq Epi: x / Non Sq Epi: 1 /hpf / Bacteria: Negative      < from: Transthoracic Echocardiogram (22 @ 13:44) >  Conclusions:  1. Calcified trileaflet aortic valve with normal opening.  Moderate aortic regurgitation.  2. Increased relative wall thickness with normal left  ventricular mass index, consistent with concentric left ventricular remodeling.  3. Hyperdynamic left ventricular systolic function.  4. Normal right ventricular size and function.  5. Normal tricuspid valve. Moderate tricuspid regurgitation.  6. Estimated pulmonary artery systolic pressure equals 39  mm Hg, assuming right atrial pressure equals 8 mm Hg,  consistent with borderline pulmonary pressures.  *** No previous Echo exam.  < end of copied text >    < from: CT Angio Chest PE Protocol w/ IV Cont (22 @ 13:47) >  IMPRESSION:  Acute bilateral pulmonary embolism, more prominent in the right lower   lobe. Right heart strain. Patchy airspace consolidation in the right   lower lobe and right middle lobe which likely represent   atelectasis/pneumonia, however pulmonary infarct cannot be excluded in   the setting of acute pulmonary embolism. Trace right pleural effusion.    Acute DVT left femoral vein, possible acute DVT right femoral vein.    Ulcerated plaques of the aortic arch and proximal descending thoracic   aorta.  1.9 cm focal outpouching of contrast into the left lateral aortic   wall at the aortic hiatus consistent with a penetrating ulcer.  < end of copied text >    ___________________________________________________________________________________  ===============>>  A S S E S S M E N T   A N D   P L A N <<===============  ------------------------------------------------------------------------------------------    · Assessment	  Patient is a 87 yo Female ( lives independently, normal mental status per Dtr)  with past medical history of arrythmia (unknown which kind) and hyperthyroidism, prior DVT and HLD, who presents to ED with few days of ruq abdominal pain that radiates to back and shortness of breath, no cp, n/v/f/c.   pt in ED diagnosed with acute PE as bellow and started on Heparin drip  pt otherwise comfortable, on minimal O2   pt also incidentally on CT found to have an aortic ulcerated plaque as bellow, vascular consulted and noted   per pt's Dtr pt had a DVT last year, was on Xarelto but pt had reportedly stopped taking it ? months ago as was feeling better..        Problem/Plan - 1:  ·  Problem: Venous thromboembolism (VTE).   ·  Plan: pt with proximal DVTs and bilateral PEs  heparin drip >> eliquis today      will need life long AC ( D/W grand Dtr at bedside again > who translated for pt )   Echo as above   tele   cancer markers noted   monitor vitals labs.  pain mgmt as needed     Problem/Plan - 2:  ·  Problem: UTI  positive U/A  started on Oral abx in prep for possible DC soon    Problem/Plan - 3:  ·  Problem: Hyperthyroidism.   ·  Plan: continue tapazol home dose  TSH.  1.87    Problem/Plan - 4:  ·  Problem: Cardiac arrhythmia.   ·  Plan: likely AF but family not sure  continue metoprolol  continue AC  cardio appreciated  tele.    Problem/Plan - 5:  ·  Problem: HLD (hyperlipidemia).   ·  Plan: Continue home medications and monitor.  lipid profile. noted     Problem/Plan - 6:  ·  Problem: hyponatremia   monitor closely for developing SIADH  encourage PO intake >> encouraged again   monitor BMP     possible DC planing next 24 to 48 hrs if stable     --------------------------------------------  Case discussed with pt's grand Dtr at bedside   Education given on findings and plan of care    Today I had a prolonged conversation with the patient, ITZELLEYLA NOE, lilia diagnosis, findings, and plan of care, options, alternatives, prognosis... . Patient verbalized clear understanding, all questions answered.    patient seen according to [facility] cost code ( 49959 )     Additional time spent  35 min.  ___________________________  H. DORA Collins.  Pager: [phone]

## 2022-08-03 LAB
ANION GAP SERPL CALC-SCNC: 13 MMOL/L — SIGNIFICANT CHANGE UP (ref 5–17)
APTT BLD: 30.6 SEC — SIGNIFICANT CHANGE UP (ref 27.5–35.5)
BUN SERPL-MCNC: 20 MG/DL — SIGNIFICANT CHANGE UP (ref 7–23)
CALCIUM SERPL-MCNC: 8.8 MG/DL — SIGNIFICANT CHANGE UP (ref 8.4–10.5)
CHLORIDE SERPL-SCNC: 102 MMOL/L — SIGNIFICANT CHANGE UP (ref 96–108)
CO2 SERPL-SCNC: 21 MMOL/L — LOW (ref 22–31)
CREAT SERPL-MCNC: 0.86 MG/DL — SIGNIFICANT CHANGE UP (ref 0.5–1.3)
EGFR: 65 ML/MIN/1.73M2 — SIGNIFICANT CHANGE UP
GLUCOSE SERPL-MCNC: 82 MG/DL — SIGNIFICANT CHANGE UP (ref 70–99)
HCT VFR BLD CALC: 37.8 % — SIGNIFICANT CHANGE UP (ref 34.5–45)
HGB BLD-MCNC: 12.9 G/DL — SIGNIFICANT CHANGE UP (ref 11.5–15.5)
MCHC RBC-ENTMCNC: 29.4 PG — SIGNIFICANT CHANGE UP (ref 27–34)
MCHC RBC-ENTMCNC: 34.1 GM/DL — SIGNIFICANT CHANGE UP (ref 32–36)
MCV RBC AUTO: 86.1 FL — SIGNIFICANT CHANGE UP (ref 80–100)
NRBC # BLD: 0 /100 WBCS — SIGNIFICANT CHANGE UP (ref 0–0)
PLATELET # BLD AUTO: 261 K/UL — SIGNIFICANT CHANGE UP (ref 150–400)
POTASSIUM SERPL-MCNC: 4.3 MMOL/L — SIGNIFICANT CHANGE UP (ref 3.5–5.3)
POTASSIUM SERPL-SCNC: 4.3 MMOL/L — SIGNIFICANT CHANGE UP (ref 3.5–5.3)
RBC # BLD: 4.39 M/UL — SIGNIFICANT CHANGE UP (ref 3.8–5.2)
RBC # FLD: 13.2 % — SIGNIFICANT CHANGE UP (ref 10.3–14.5)
SODIUM SERPL-SCNC: 136 MMOL/L — SIGNIFICANT CHANGE UP (ref 135–145)
WBC # BLD: 7.54 K/UL — SIGNIFICANT CHANGE UP (ref 3.8–10.5)
WBC # FLD AUTO: 7.54 K/UL — SIGNIFICANT CHANGE UP (ref 3.8–10.5)

## 2022-08-03 RX ORDER — PANTOPRAZOLE SODIUM 20 MG/1
1 TABLET, DELAYED RELEASE ORAL
Qty: 30 | Refills: 0
Start: 2022-08-03 | End: 2022-09-01

## 2022-08-03 RX ORDER — APIXABAN 2.5 MG/1
2 TABLET, FILM COATED ORAL
Qty: 74 | Refills: 0
Start: 2022-08-03 | End: 2022-09-01

## 2022-08-03 RX ORDER — RIVAROXABAN 15 MG-20MG
1 KIT ORAL
Qty: 51 | Refills: 0
Start: 2022-08-03 | End: 2022-09-01

## 2022-08-03 RX ORDER — APIXABAN 2.5 MG/1
2 TABLET, FILM COATED ORAL
Qty: 100 | Refills: 0
Start: 2022-08-03 | End: 2022-09-01

## 2022-08-03 RX ADMIN — Medication 1 TABLET(S): at 11:38

## 2022-08-03 RX ADMIN — Medication 81 MILLIGRAM(S): at 11:38

## 2022-08-03 RX ADMIN — APIXABAN 10 MILLIGRAM(S): 2.5 TABLET, FILM COATED ORAL at 05:30

## 2022-08-03 RX ADMIN — Medication 25 MILLIGRAM(S): at 05:30

## 2022-08-03 RX ADMIN — Medication 1 TABLET(S): at 23:00

## 2022-08-03 RX ADMIN — APIXABAN 10 MILLIGRAM(S): 2.5 TABLET, FILM COATED ORAL at 17:47

## 2022-08-03 RX ADMIN — ATORVASTATIN CALCIUM 20 MILLIGRAM(S): 80 TABLET, FILM COATED ORAL at 23:00

## 2022-08-03 RX ADMIN — Medication 25 MILLIGRAM(S): at 17:47

## 2022-08-03 RX ADMIN — PANTOPRAZOLE SODIUM 40 MILLIGRAM(S): 20 TABLET, DELAYED RELEASE ORAL at 05:30

## 2022-08-03 RX ADMIN — Medication 3 MILLIGRAM(S): at 23:00

## 2022-08-03 RX ADMIN — DONEPEZIL HYDROCHLORIDE 5 MILLIGRAM(S): 10 TABLET, FILM COATED ORAL at 23:00

## 2022-08-03 NOTE — PHYSICAL THERAPY INITIAL EVALUATION ADULT - PLANNED THERAPY INTERVENTIONS, PT EVAL
GOAL: Pt will negotiate 1 flight of stairs using handrail independently to ensure safe home entry, within 2 weeks./balance training/gait training/strengthening/transfer training

## 2022-08-03 NOTE — PHYSICAL THERAPY INITIAL EVALUATION ADULT - PRECAUTIONS/LIMITATIONS, REHAB EVAL
HPI Cont: CXR: mild R basilar atelectasis/consolidation, otherwise lungs clear. Chest/abd/pelvis CT: Acute b/l PE. +R heart strain. Patchy airspace consolidation RLL, RML, atelectasis/PNA, pulm infarct cannot be excluded. Trace R pulm effusion. Acute DVT L femoral vein, possible acute DVT R femoral vein. Ulcerated plaques aortic arch, proximal descending thoracic aorta. Bones: Mild thoracolumbar scoliosis.Multilevel degenerative changes of spine worse at L4-5, L5-S1, grade 1 antherolisthesis L4 over L5. BLE US: R DVT below knee in gastroc and soleus veins. L DVT above knee at common femoral vein extending into external iliac vein, acute DVT below knee at peroneal veins. HPI Cont: CXR: mild R basilar atelectasis/consolidation, otherwise lungs clear. Chest/abd/pelvis CT: Acute b/l PE. +R heart strain. Patchy airspace consolidation RLL, RML, atelectasis/PNA, pulm infarct cannot be excluded. Trace R pulm effusion. Acute DVT L femoral vein, possible acute DVT R femoral vein. Ulcerated plaques aortic arch, proximal descending thoracic aorta. Bones: Mild thoracolumbar scoliosis.Multilevel degenerative changes of spine worse at L4-5, L5-S1, grade 1 antherolisthesis L4 over L5. BLE US: R DVT below knee in gastroc and soleus veins. L DVT above knee at common femoral vein extending into external iliac vein, acute DVT below knee at peroneal veins./cardiac precautions/fall precautions

## 2022-08-03 NOTE — PROGRESS NOTE ADULT - ASSESSMENT
( Note written / Date of service 08-03-22 )    ==================>> MEDICATIONS <<====================    amoxicillin  500 milliGRAM(s)/clavulanate 1 Tablet(s) Oral every 12 hours  apixaban 10 milliGRAM(s) Oral every 12 hours  aspirin enteric coated 81 milliGRAM(s) Oral daily  atorvastatin 20 milliGRAM(s) Oral at bedtime  donepezil 5 milliGRAM(s) Oral at bedtime  methimazole 2.5 milliGRAM(s) Oral daily  metoprolol tartrate 25 milliGRAM(s) Oral two times a day  pantoprazole    Tablet 40 milliGRAM(s) Oral before breakfast    MEDICATIONS  (PRN):  acetaminophen     Tablet .. 650 milliGRAM(s) Oral every 6 hours PRN Temp greater or equal to 38C (100.4F), Mild Pain (1 - 3)  aluminum hydroxide/magnesium hydroxide/simethicone Suspension 30 milliLiter(s) Oral every 4 hours PRN Dyspepsia  melatonin 3 milliGRAM(s) Oral at bedtime PRN Insomnia  ondansetron Injectable 4 milliGRAM(s) IV Push every 8 hours PRN Nausea and/or Vomiting    ___________  Active diet:  Diet, Regular:   DASH/TLC Sodium & Cholesterol Restricted (DASH)  Supplement Feeding Modality:  Oral  Ensure Enlive Cans or Servings Per Day:  2       Frequency:  Two Times a day  ___________________    ==================>> VITAL SIGNS <<==================    Vital Signs Last 24 HrsT(C): 37 (08-03-22 @ 11:33)  T(F): 98.6 (08-03-22 @ 11:33), Max: 98.6 (08-03-22 @ 11:33)  HR: 78 (08-03-22 @ 16:56) (78 - 84)  BP: 111/60 (08-03-22 @ 16:56)  RR: 18 (08-03-22 @ 11:33) (18 - 19)  SpO2: 93% (08-03-22 @ 11:33) (93% - 97%)      CAPILLARY BLOOD GLUCOSE         ==================>> LAB AND IMAGING <<==================                        12.9   7.54  )-----------( 261      ( 03 Aug 2022 07:11 )             37.8        08-03    136  |  102  |  20  ----------------------------<  82  4.3   |  21<L>  |  0.86    Ca    8.8      03 Aug 2022 07:12      WBC count:   7.54 <<== ,  9.87 <<== ,  11.02 <<== ,  11.56 <<== ,  10.22 <<==   Hemoglobin:   12.9 <<==,  13.9 <<==,  13.3 <<==,  13.8 <<==,  14.1 <<==  platelets:  261 <==, 236 <==, 212 <==, 199 <==, 191 <==    Creatinine:  0.86  <<==, 0.74  <<==, 0.90  <<==  Sodium:   136  <==, 133  <==, 136  <==       AST:          16 <== , 15 <==      ALT:        12  <== , 15  <==      AP:        68  <=, 74  <=     Bili:        0.6  <=, 0.8  <=    ____________________________    M I C R O B I O L O G Y :         _________________________________________________________________________________________  ========>>  M E D I C A L   A T T E N D I N G    F O L L O W  U P  N O T E  <<=========  -----------------------------------------------------------------------------------------------------    - Patient seen and examined by me earlier today.   - In summary,  SIMON NOE is a 88y year old woman admitted with VTE  - Patient today overall doing ok, comfortable, eating fairly    ==================>> REVIEW OF SYSTEM <<=================    GEN: no fever, no chills, no pain   RESP: no SOB, no cough, no sputum  CVS: no chest pain, no palpitations, as above: much improved   GI: no abdominal pain, no nausea, appetite fair- poor   : no dysuria, no frequency, no hematuria  Neuro: no headache, no dizziness  Derm : no itching, no rash    ==================>> PHYSICAL EXAM <<=================    GEN: A&O X 3 , NAD , comfortable, pleasant, calm in bed    HEENT: NCAT, PERRL, MMM, hearing intact, off O2 >>>  also ambulating off o2, saturating well   CVS: S1S2 , regular , No M/R/G appreciated  PULM: CTA B/L,  limited exam as not taking deep breaths   ABD.: soft. non tender, non distended,  bowel sounds present  Extrem: intact pulses , no edema   PSYCH : normal mood,  not anxious                              ( Note written / Date of service 08-03-22 )    ==================>> MEDICATIONS <<====================    amoxicillin  500 milliGRAM(s)/clavulanate 1 Tablet(s) Oral every 12 hours  apixaban 10 milliGRAM(s) Oral every 12 hours  aspirin enteric coated 81 milliGRAM(s) Oral daily  atorvastatin 20 milliGRAM(s) Oral at bedtime  donepezil 5 milliGRAM(s) Oral at bedtime  methimazole 2.5 milliGRAM(s) Oral daily  metoprolol tartrate 25 milliGRAM(s) Oral two times a day  pantoprazole    Tablet 40 milliGRAM(s) Oral before breakfast    MEDICATIONS  (PRN):  acetaminophen     Tablet .. 650 milliGRAM(s) Oral every 6 hours PRN Temp greater or equal to 38C (100.4F), Mild Pain (1 - 3)  aluminum hydroxide/magnesium hydroxide/simethicone Suspension 30 milliLiter(s) Oral every 4 hours PRN Dyspepsia  melatonin 3 milliGRAM(s) Oral at bedtime PRN Insomnia  ondansetron Injectable 4 milliGRAM(s) IV Push every 8 hours PRN Nausea and/or Vomiting    ___________  Active diet:  Diet, Regular:   DASH/TLC Sodium & Cholesterol Restricted (DASH)  Supplement Feeding Modality:  Oral  Ensure Enlive Cans or Servings Per Day:  2       Frequency:  Two Times a day  ___________________    ==================>> VITAL SIGNS <<==================    Vital Signs Last 24 HrsT(C): 37 (08-03-22 @ 11:33)  T(F): 98.6 (08-03-22 @ 11:33), Max: 98.6 (08-03-22 @ 11:33)  HR: 78 (08-03-22 @ 16:56) (78 - 84)  BP: 111/60 (08-03-22 @ 16:56)  RR: 18 (08-03-22 @ 11:33) (18 - 19)  SpO2: 93% (08-03-22 @ 11:33) (93% - 97%)       ==================>> LAB AND IMAGING <<==================                        12.9   7.54  )-----------( 261      ( 03 Aug 2022 07:11 )             37.8        08-03    136  |  102  |  20  ----------------------------<  82  4.3   |  21<L>  |  0.86    Ca    8.8      03 Aug 2022 07:12      WBC count:   7.54 <<== ,  9.87 <<== ,  11.02 <<== ,  11.56 <<== ,  10.22 <<==   Hemoglobin:   12.9 <<==,  13.9 <<==,  13.3 <<==,  13.8 <<==,  14.1 <<==  platelets:  261 <==, 236 <==, 212 <==, 199 <==, 191 <==    Creatinine:  0.86  <<==, 0.74  <<==, 0.90  <<==  Sodium:   136  <==, 133  <==, 136  <==       AST:          16 <== , 15 <==      ALT:        12  <== , 15  <==      AP:        68  <=, 74  <=     Bili:        0.6  <=, 0.8  <=      < from: Transthoracic Echocardiogram (08.01.22 @ 13:44) >  Conclusions:  1. Calcified trileaflet aortic valve with normal opening.  Moderate aortic regurgitation.  2. Increased relative wall thickness with normal left  ventricular mass index, consistent with concentric left ventricular remodeling.  3. Hyperdynamic left ventricular systolic function.  4. Normal right ventricular size and function.  5. Normal tricuspid valve. Moderate tricuspid regurgitation.  6. Estimated pulmonary artery systolic pressure equals 39  mm Hg, assuming right atrial pressure equals 8 mm Hg,  consistent with borderline pulmonary pressures.  *** No previous Echo exam.  < end of copied text >    < from: CT Angio Chest PE Protocol w/ IV Cont (07.31.22 @ 13:47) >  IMPRESSION:  Acute bilateral pulmonary embolism, more prominent in the right lower   lobe. Right heart strain. Patchy airspace consolidation in the right   lower lobe and right middle lobe which likely represent   atelectasis/pneumonia, however pulmonary infarct cannot be excluded in   the setting of acute pulmonary embolism. Trace right pleural effusion.    Acute DVT left femoral vein, possible acute DVT right femoral vein.    Ulcerated plaques of the aortic arch and proximal descending thoracic   aorta.  1.9 cm focal outpouching of contrast into the left lateral aortic   wall at the aortic hiatus consistent with a penetrating ulcer.  < end of copied text >    ___________________________________________________________________________________  ===============>>  A S S E S S M E N T   A N D   P L A N <<===============  ------------------------------------------------------------------------------------------    · Assessment	  Patient is a 89 yo Female ( lives independently, normal mental status per Dtr)  with past medical history of arrythmia (unknown which kind) and hyperthyroidism, prior DVT and HLD, who presents to ED with few days of ruq abdominal pain that radiates to back and shortness of breath, no cp, n/v/f/c.   pt in ED diagnosed with acute PE as bellow and started on Heparin drip  pt otherwise comfortable, on minimal O2   pt also incidentally on CT found to have an aortic ulcerated plaque as colby, vascular consulted and noted   per pt's Dtr pt had a DVT last year, was on Xarelto but pt had reportedly stopped taking it ? months ago as was feeling better..        Problem/Plan - 1:  ·  Problem: Venous thromboembolism (VTE).   ·  Plan: pt with proximal DVTs and bilateral PEs  heparin drip >> eliquis  >> monito for bleeding     >> check with insurance for coverage ( vs other NOACs)      will need life long AC ( D/W grand Dtr at bedside again > who translated for pt )   Echo as above   tele   cancer markers noted   monitor vitals labs.  pain mgmt as needed     Problem/Plan - 2:  ·  Problem: UTI  positive U/A   on Oral abx > finish as planned     Problem/Plan - 3:  ·  Problem: Hyperthyroidism.   ·  Plan: continue tapazol home dose  TSH.  1.87    Problem/Plan - 4:  ·  Problem: Cardiac arrhythmia.   ·  Plan: likely AF but family not sure  continue metoprolol  continue AC  cardio appreciated  tele.    Problem/Plan - 5:  ·  Problem: HLD (hyperlipidemia).   ·  Plan: Continue home medications and monitor.  lipid profile. noted     Problem/Plan - 6:  ·  Problem: hyponatremia   monitor closely for developing SIADH  encourage PO intake >> encouraged again   monitor BMP     possible DC planing next 24 hrs if stable and Eliquis covered     --------------------------------------------  Case discussed with pt's grand Dtr at bedside   Education given on findings and plan of care    I had a prolonged conversation with the patient, SIMON NOE, via her grand dtrs re diagnosis, findings, and plan of care, options, alternatives, prognosis... . Patient verbalized clear understanding, all questions answered.    ___________________________  HJan Collins D.O.  Pager: [phone]

## 2022-08-03 NOTE — DISCHARGE NOTE PROVIDER - NSDCCAREPROVSEEN_GEN_ALL_CORE_FT
Yu Coley Hoorbod Sofie Bynum  Ordering Physician  [facility]      [ Greater than 35 min spent for discharge services.   CORY Collins ]

## 2022-08-03 NOTE — PHYSICAL THERAPY INITIAL EVALUATION ADULT - PERTINENT HX OF CURRENT PROBLEM, REHAB EVAL
Pt is an 88y F PMHx arrythmia (unknown kind), hyperthyroidism, prior DVT, HLD, presents to ED with few days RUQ abd pain radiating ot back, with SOB, no CP. In ED dx w acute PT, started on heparin drip, on minimal O2. CT noted aortic ulceratic plaque, vascular consulted. Had DVT last year, was on Xarelto, reportedly stopped taking it months ago.

## 2022-08-03 NOTE — PHYSICAL THERAPY INITIAL EVALUATION ADULT - GENERAL OBSERVATIONS, REHAB EVAL
Pt received supine in bed in NAD, with PIV, tele, A&Ox4, VS screened and stable. Polish  used. Granddaughter at bedside. Pt with BLE DVT on AC, +PE w R heart strain, per discussion with ATUL Lan, pt cleared for functional evaluation, no mobility precautions.

## 2022-08-03 NOTE — PHYSICAL THERAPY INITIAL EVALUATION ADULT - TRANSFER TRAINING, PT EVAL
GOAL: Pt will independently perform sit to/from stand transfers from various surfaces without LOB by 2 weeks.

## 2022-08-03 NOTE — DISCHARGE NOTE PROVIDER - NSDCCPTREATMENT_GEN_ALL_CORE_FT
PRINCIPAL PROCEDURE  Procedure: CT chest w/ contrast  Findings and Treatment: In addition to the finding of pulmonary embolism. found to have a 1.9 cm aortic ulceration. Vascular surgery was consulted, however no acute intervention necessary. follow with your cardiologist

## 2022-08-03 NOTE — PHYSICAL THERAPY INITIAL EVALUATION ADULT - BALANCE TRAINING, PT EVAL
GOAL: Patient will improve static and dynamic standing balance by 1/2 grade within 2 weeks to improve safety and decrease risk of falls.

## 2022-08-03 NOTE — DISCHARGE NOTE NURSING/CASE MANAGEMENT/SOCIAL WORK - NSDCPEFALRISK_GEN_ALL_CORE
For information on Fall & Injury Prevention, visit: [email or web address] OR  [email or web address] OR  https://www.cdc.gov/steadi/patient.html

## 2022-08-03 NOTE — DISCHARGE NOTE PROVIDER - NSDCCPCAREPLAN_GEN_ALL_CORE_FT
PRINCIPAL DISCHARGE DIAGNOSIS  Diagnosis: Pulmonary embolism  Assessment and Plan of Treatment: Presented with Chest pain and shortness of breath due to Pulmonary embolism. With history of DVT and with acute bilateral DVT now, likely cause of Pulmonary embolism. Make sure to ambulate as tolerated around.   Must continue Eliquis as instructed. You are to continue Eliquis 10 mg (2 tablets) two times a day until 8/9 AM, then 5 mg two times a day starting 8/9 pm. make sure to obtain refills from your doctor going forward.   If shortness of breath or chest pain worsens go to the emergency room      SECONDARY DISCHARGE DIAGNOSES  Diagnosis: Hyperthyroidism  Assessment and Plan of Treatment: continue home medications    Diagnosis: Acute UTI  Assessment and Plan of Treatment: positive urinalysis, started on oral antibiotics for a total of 3 days. you have 2 more days left. follow instruction of prescription     PRINCIPAL DISCHARGE DIAGNOSIS  Diagnosis: Pulmonary embolism  Assessment and Plan of Treatment: Presented with Chest pain and shortness of breath due to Pulmonary embolism. With history of DVT and with acute bilateral DVT now, likely cause of Pulmonary embolism. Make sure to ambulate as tolerated around.   Must continue Eliquis as instructed. You are to continue Eliquis 10 mg (2 tablets) two times a day until 8/9 AM, then 5 mg two times a day starting 8/9 pm. make sure to obtain refills from your doctor going forward.   If shortness of breath or chest pain worsens go to the emergency room      SECONDARY DISCHARGE DIAGNOSES  Diagnosis: Hyperthyroidism  Assessment and Plan of Treatment: you do not make enough thyroid hormone  signs & symptoms of low levels of thyroid hormone - tired, getting cold easily, coarse or thin hair, constipation, shortness of breath, swelling, irregular periods  your doctor will do thyroid hormone blood tests at least once a year to monitor if medication dose is adequate  take your thyroid medicine as directed by your doctor & on empty stomach      Diagnosis: Acute UTI  Assessment and Plan of Treatment: positive urinalysis, started on oral antibiotics for a total of 3 days. you have 1 more dose left.   You had a bladder &/or kidney infection  Call your doctor with pain or burning with urination, frequent urination, feeling to urinate suddenly, blood in urine, fever, back pain, nausea or vomiting  You need to take and finish your antibiotic as prescribed so that the infection does not reoccur  Drink adequate fluids - there is no harm to try cranberry juice

## 2022-08-03 NOTE — DISCHARGE NOTE PROVIDER - NSDCMRMEDTOKEN_GEN_ALL_CORE_FT
amoxicillin-clavulanate 500 mg-125 mg oral tablet: 1 tab(s) orally every 12 hours  apixaban 5 mg oral tablet: 2 tab(s) orally every 12 hours until 8/9 AM, then 1 tab every 12 hours starting 8/9 PM  aspirin 81 mg oral delayed release tablet: 1 tab(s) orally once a day  donepezil 5 mg oral tablet: 1 tab(s) orally once a day (at bedtime)  Lipitor 20 mg oral tablet: 1 tab(s) orally once a day  methIMAzole 5 mg oral tablet: 0.5 tab(s) orally once a day  Metoprolol Tartrate 25 mg oral tablet: 1 tab(s) orally 2 times a day  pantoprazole 40 mg oral delayed release tablet: 1 tab(s) orally once a day (before a meal)   amoxicillin-clavulanate 500 mg-125 mg oral tablet: 1 tab(s) orally every 12 hours, you need 1 more dose to complete your 3 day course.  apixaban 5 mg oral tablet: 2 tab(s) orally every 12 hours until 8/9 AM, then 1 tab every 12 hours starting 8/9 PM  aspirin 81 mg oral delayed release tablet: 1 tab(s) orally once a day  donepezil 5 mg oral tablet: 1 tab(s) orally once a day (at bedtime)  Lipitor 20 mg oral tablet: 1 tab(s) orally once a day  methIMAzole 5 mg oral tablet: 0.5 tab(s) orally once a day  Metoprolol Tartrate 25 mg oral tablet: 1 tab(s) orally 2 times a day  pantoprazole 40 mg oral delayed release tablet: 1 tab(s) orally once a day (before a meal)

## 2022-08-03 NOTE — PHYSICAL THERAPY INITIAL EVALUATION ADULT - ADDITIONAL COMMENTS
Pt lives in private house with 6 steps to enter +handrail, no stairs to bed/bath. Pt performed ADL/IADLs  and ambulates independently without AD. Pt reports 0 falls within the last year. Her son is home with her throughout the day but reports does not need assistance, negotiates community environment independently.

## 2022-08-03 NOTE — DISCHARGE NOTE PROVIDER - HOSPITAL COURSE
Patient is a 87 yo Female ( lives independently, normal mental status per Dtr)  with past medical history of arrythmia (unknown which kind) and hyperthyroidism, prior DVT and HLD, who presents to ED with few days of ruq abdominal pain that radiates to back and shortness of breath, no cp, n/v/f/c.   pt in ED diagnosed with acute PE as bellow and started on Heparin drip  pt otherwise comfortable, on minimal O2   pt also incidentally on CT found to have an aortic ulcerated plaque as bellow, vascular consulted and noted   per pt's Dtr pt had a DVT last year, was on Xarelto but pt had reportedly stopped taking it ? months ago as was feeling better..         Problem/Plan - 1:  ·  Problem: Venous thromboembolism (VTE).   ·  Plan: pt with proximal DVTs and bilateral PEs  heparin drip >> eliquis today      will need life long AC ( D/W grand Dtr at bedside again > who translated for pt )   Echo as above   tele   cancer markers noted   monitor vitals labs.  pain mgmt as needed      Problem/Plan - 2:  ·  Problem: UTI  positive U/A  started on Oral abx in prep for possible DC soon     Problem/Plan - 3:  ·  Problem: Hyperthyroidism.   ·  Plan: continue tapazol home dose  TSH.  1.87     Problem/Plan - 4:  ·  Problem: Cardiac arrhythmia.   ·  Plan: likely AF but family not sure  continue metoprolol  continue AC  cardio appreciated  tele.     Problem/Plan - 5:  ·  Problem: HLD (hyperlipidemia).   ·  Plan: Continue home medications and monitor.  lipid profile. noted      Problem/Plan - 6:  ·  Problem: hyponatremia   monitor closely for developing SIADH  encourage PO intake >> encouraged again   monitor BMP     Patient clear for dc per DR. Collins Patient is a 87 yo Female ( lives independently, normal mental status per Dtr)  with past medical history of arrythmia (unknown which kind) and hyperthyroidism, prior DVT and HLD, who presents to ED with few days of ruq abdominal pain that radiates to back and shortness of breath, no cp, n/v/f/c.   pt in ED diagnosed with acute PE as bellow and started on Heparin drip  pt otherwise comfortable, on minimal O2   pt also incidentally on CT found to have an aortic ulcerated plaque as bellow, vascular consulted and noted   per pt's Dtr pt had a DVT last year, was on Xarelto but pt had reportedly stopped taking it ? months ago as was feeling better..          Problem: Venous thromboembolism (VTE).   ·  Plan: pt with proximal DVTs and bilateral PEs  heparin drip >> eliquis today      will need life long AC ( D/W grand Dtr at bedside again > who translated for pt )   Echo as above   tele   cancer markers noted   monitor vitals labs.  pain mgmt as needed     ·  Problem: UTI  positive U/A  started on Oral abx in prep for possible DC soon    ·  Problem: Hyperthyroidism.   ·  Plan: continue tapazol home dose  TSH.  1.87    ·  Problem: Cardiac arrhythmia.   ·  Plan: likely AF but family not sure  continue metoprolol  continue AC  cardio appreciated  tele.    ·  Problem: HLD (hyperlipidemia).   ·  Plan: Continue home medications and monitor.  lipid profile. noted       ·  Problem: hyponatremia   monitor closely for developing SIADH  encourage PO intake >> encouraged again   monitor BMP     Patient clear for dc per DR. Collins Patient is a 89 yo Female ( lives independently, normal mental status per Dtr)  with past medical history of arrythmia (unknown which kind) and hyperthyroidism, prior DVT and HLD, who presents to ED with few days of ruq abdominal pain that radiates to back and shortness of breath, no cp, n/v/f/c.   pt in ED diagnosed with acute PE as bellow and started on Heparin drip  pt otherwise comfortable, on minimal O2   pt also incidentally on CT found to have an aortic ulcerated plaque as bellow, vascular consulted and noted   per pt's Dtr pt had a DVT last year, was on Xarelto but pt had reportedly stopped taking it ? months ago as was feeling better..     Summery of hospital course:  Patient was seen and evaluated and followed by multiple specialists throughout the stay and treated medically with improvement.  Patient was otherwise stable and had no other complications. See chart for further details.  Patient was discharged to home in stable condition to follow up with primary doctor as outpatient for follow up and further care.

## 2022-08-03 NOTE — DISCHARGE NOTE PROVIDER - ATTENDING ATTESTATION STATEMENT
April 22, 2019      Joey Mueller MD  [address]  [address]  [facility] [address]           [facility]  [address]  [facility] [address]  Phone: [phone]          Patient: Chreyl Ghosh   MR Number: [id number]   YOB: 1934   Date of Visit: 4/22/2019       Dear Dr. Joey Mueller:    Thank you for referring Cheryl Ghosh to me for evaluation. Attached you will find relevant portions of my assessment and plan of care.    If you have questions, please do not hesitate to call me. I look forward to following Cheryl Ghosh along with you.    Sincerely,    Bk Rojo MD    Enclosure  CC:  No Recipients    If you would like to receive this communication electronically, please contact [email or web address] or [phone] to request more information on [facility] access.    For providers and/or their staff who would like to refer a patient to [facility], please contact us through our one-stop-shop provider referral line, [facility], at [phone].    If you feel you have received this communication in error or would no longer like to receive these types of communications, please e-mail [email or web address]          I have personally seen and examined the patient. I have collaborated with and supervised the

## 2022-08-03 NOTE — DISCHARGE NOTE PROVIDER - NSDCFUADDAPPT_GEN_ALL_CORE_FT
Please follow up with your cardiologist, or if you do not have one provided above is the address for Dr. Coley, however make sure to ask for Yu Coley (Daniel).

## 2022-08-03 NOTE — PHARMACOTHERAPY INTERVENTION NOTE - COMMENTS
Counseled patient's daughter on Eliquis, amoxicillin/Clavulanate, and pantoprazole for PE and UTI. [facility]. Patient will have to pay out a $300 deductible and $47 copay thereafter for a month supply of Eliquis. The daughter expressed understanding on instructions, indications, and side effects of medications. Patient's questions and concerns were answered and addressed.      Gisel Fernando, PGY-1 Pharmacy Resident  [facility] [address]   Counseled patient's daughter on Eliquis, amoxicillin/clavulanate, and pantoprazole for PE and UTI. All prescriptions were sent to [facility]. Informed daughter that patient will have to pay out a $300 deductible and $47 copay thereafter for a month supply of Eliquis. The daughter expressed understanding on instructions, indications, and side effects of medications. Questions and concerns were answered and addressed.      Gisel Fernando, PGY-1 Pharmacy Resident  [facility] [id number]

## 2022-08-03 NOTE — DISCHARGE NOTE NURSING/CASE MANAGEMENT/SOCIAL WORK - PATIENT PORTAL LINK FT
You can access the FollowMyHealth Patient Portal offered by [facility] [employer] by registering at the following website: [email or web address]. By joining [employer]’s FollowMyHealth portal, you will also be able to view your health information using other applications (apps) compatible with our system.

## 2022-08-03 NOTE — DISCHARGE NOTE PROVIDER - CARE PROVIDER_API CALL
Holli Ferguson  Geriatric Medicine  [address] [place] [address]  Phone: [phone]  Fax: [phone]  Follow Up Time: 1 week    Duran Coley  CARDIOLOGY  [address] [facility], [address] [place] [address]  Phone: [phone]  Fax: [phone]  Follow Up Time:

## 2022-08-04 VITALS
OXYGEN SATURATION: 92 % | HEART RATE: 72 BPM | RESPIRATION RATE: 18 BRPM | DIASTOLIC BLOOD PRESSURE: 74 MMHG | SYSTOLIC BLOOD PRESSURE: 118 MMHG | TEMPERATURE: 98 F

## 2022-08-04 LAB
HCT VFR BLD CALC: 37 % — SIGNIFICANT CHANGE UP (ref 34.5–45)
HGB BLD-MCNC: 12.4 G/DL — SIGNIFICANT CHANGE UP (ref 11.5–15.5)
MCHC RBC-ENTMCNC: 29 PG — SIGNIFICANT CHANGE UP (ref 27–34)
MCHC RBC-ENTMCNC: 33.5 GM/DL — SIGNIFICANT CHANGE UP (ref 32–36)
MCV RBC AUTO: 86.4 FL — SIGNIFICANT CHANGE UP (ref 80–100)
NRBC # BLD: 0 /100 WBCS — SIGNIFICANT CHANGE UP (ref 0–0)
PLATELET # BLD AUTO: 282 K/UL — SIGNIFICANT CHANGE UP (ref 150–400)
RBC # BLD: 4.28 M/UL — SIGNIFICANT CHANGE UP (ref 3.8–5.2)
RBC # FLD: 13.3 % — SIGNIFICANT CHANGE UP (ref 10.3–14.5)
WBC # BLD: 7.21 K/UL — SIGNIFICANT CHANGE UP (ref 3.8–10.5)
WBC # FLD AUTO: 7.21 K/UL — SIGNIFICANT CHANGE UP (ref 3.8–10.5)

## 2022-08-04 PROCEDURE — 85014 HEMATOCRIT: CPT

## 2022-08-04 PROCEDURE — 96376 TX/PRO/DX INJ SAME DRUG ADON: CPT

## 2022-08-04 PROCEDURE — 83690 ASSAY OF LIPASE: CPT

## 2022-08-04 PROCEDURE — 82803 BLOOD GASES ANY COMBINATION: CPT

## 2022-08-04 PROCEDURE — 80061 LIPID PANEL: CPT

## 2022-08-04 PROCEDURE — 84132 ASSAY OF SERUM POTASSIUM: CPT

## 2022-08-04 PROCEDURE — 83605 ASSAY OF LACTIC ACID: CPT

## 2022-08-04 PROCEDURE — 82330 ASSAY OF CALCIUM: CPT

## 2022-08-04 PROCEDURE — 93306 TTE W/DOPPLER COMPLETE: CPT

## 2022-08-04 PROCEDURE — 82378 CARCINOEMBRYONIC ANTIGEN: CPT

## 2022-08-04 PROCEDURE — 87637 SARSCOV2&INF A&B&RSV AMP PRB: CPT

## 2022-08-04 PROCEDURE — 86301 IMMUNOASSAY TUMOR CA 19-9: CPT

## 2022-08-04 PROCEDURE — 84484 ASSAY OF TROPONIN QUANT: CPT

## 2022-08-04 PROCEDURE — 83735 ASSAY OF MAGNESIUM: CPT

## 2022-08-04 PROCEDURE — 85018 HEMOGLOBIN: CPT

## 2022-08-04 PROCEDURE — 71275 CT ANGIOGRAPHY CHEST: CPT | Mod: MA

## 2022-08-04 PROCEDURE — 74177 CT ABD & PELVIS W/CONTRAST: CPT | Mod: MA

## 2022-08-04 PROCEDURE — 71045 X-RAY EXAM CHEST 1 VIEW: CPT

## 2022-08-04 PROCEDURE — 81001 URINALYSIS AUTO W/SCOPE: CPT

## 2022-08-04 PROCEDURE — 82435 ASSAY OF BLOOD CHLORIDE: CPT

## 2022-08-04 PROCEDURE — 84443 ASSAY THYROID STIM HORMONE: CPT

## 2022-08-04 PROCEDURE — 85025 COMPLETE CBC W/AUTO DIFF WBC: CPT

## 2022-08-04 PROCEDURE — 93005 ELECTROCARDIOGRAM TRACING: CPT

## 2022-08-04 PROCEDURE — 96374 THER/PROPH/DIAG INJ IV PUSH: CPT | Mod: XU

## 2022-08-04 PROCEDURE — 82962 GLUCOSE BLOOD TEST: CPT

## 2022-08-04 PROCEDURE — 96375 TX/PRO/DX INJ NEW DRUG ADDON: CPT | Mod: XU

## 2022-08-04 PROCEDURE — 36415 COLL VENOUS BLD VENIPUNCTURE: CPT

## 2022-08-04 PROCEDURE — 85027 COMPLETE CBC AUTOMATED: CPT

## 2022-08-04 PROCEDURE — 86300 IMMUNOASSAY TUMOR CA 15-3: CPT

## 2022-08-04 PROCEDURE — 84295 ASSAY OF SERUM SODIUM: CPT

## 2022-08-04 PROCEDURE — 82306 VITAMIN D 25 HYDROXY: CPT

## 2022-08-04 PROCEDURE — 80048 BASIC METABOLIC PNL TOTAL CA: CPT

## 2022-08-04 PROCEDURE — 97161 PT EVAL LOW COMPLEX 20 MIN: CPT

## 2022-08-04 PROCEDURE — 85730 THROMBOPLASTIN TIME PARTIAL: CPT

## 2022-08-04 PROCEDURE — 99285 EMERGENCY DEPT VISIT HI MDM: CPT

## 2022-08-04 PROCEDURE — 93970 EXTREMITY STUDY: CPT

## 2022-08-04 PROCEDURE — 82947 ASSAY GLUCOSE BLOOD QUANT: CPT

## 2022-08-04 PROCEDURE — 80053 COMPREHEN METABOLIC PANEL: CPT

## 2022-08-04 RX ORDER — DABIGATRAN ETEXILATE MESYLATE 150 MG/1
1 CAPSULE ORAL
Qty: 60 | Refills: 0
Start: 2022-08-04 | End: 2022-09-02

## 2022-08-04 RX ORDER — APIXABAN 2.5 MG/1
2 TABLET, FILM COATED ORAL
Qty: 100 | Refills: 0
Start: 2022-08-04 | End: 2022-09-02

## 2022-08-04 RX ADMIN — APIXABAN 10 MILLIGRAM(S): 2.5 TABLET, FILM COATED ORAL at 05:28

## 2022-08-04 RX ADMIN — Medication 81 MILLIGRAM(S): at 11:20

## 2022-08-04 RX ADMIN — Medication 25 MILLIGRAM(S): at 05:28

## 2022-08-04 RX ADMIN — Medication 1 TABLET(S): at 11:20

## 2022-08-04 RX ADMIN — PANTOPRAZOLE SODIUM 40 MILLIGRAM(S): 20 TABLET, DELAYED RELEASE ORAL at 05:28

## 2022-08-04 NOTE — PROGRESS NOTE ADULT - PROVIDER SPECIALTY LIST ADULT
Cardiology
Internal Medicine
Internal Medicine
Cardiology
Internal Medicine
Internal Medicine

## 2022-08-04 NOTE — PROGRESS NOTE ADULT - ASSESSMENT
M E D I C A L   A T T E N D I N G    F O L L O W    U P   N O T E  (08-04-22 )                                     ------------------------------------------------------------------------------------------------    patient evaluated by me, case discussed with team, chart, medications, and physical exam reviewed, labs / tests  and vitals reviewed by me, as bellow.   Patient is stable for discharge today.  Patient to follow up with pmd, cardiology as discussed with pt's grand Dtr t bedside   See discharge document for full note.  Greater than 35 min spent for these services.                              ( note written / Date of service  08-04-22 )    ==================>> MEDICATIONS <<====================    amoxicillin  500 milliGRAM(s)/clavulanate 1 Tablet(s) Oral every 12 hours  apixaban 10 milliGRAM(s) Oral every 12 hours  aspirin enteric coated 81 milliGRAM(s) Oral daily  atorvastatin 20 milliGRAM(s) Oral at bedtime  donepezil 5 milliGRAM(s) Oral at bedtime  methimazole 2.5 milliGRAM(s) Oral daily  metoprolol tartrate 25 milliGRAM(s) Oral two times a day  pantoprazole    Tablet 40 milliGRAM(s) Oral before breakfast    MEDICATIONS  (PRN):  acetaminophen     Tablet .. 650 milliGRAM(s) Oral every 6 hours PRN Temp greater or equal to 38C (100.4F), Mild Pain (1 - 3)  aluminum hydroxide/magnesium hydroxide/simethicone Suspension 30 milliLiter(s) Oral every 4 hours PRN Dyspepsia  melatonin 3 milliGRAM(s) Oral at bedtime PRN Insomnia  ondansetron Injectable 4 milliGRAM(s) IV Push every 8 hours PRN Nausea and/or Vomiting    ___________  Active diet:  Diet, Regular:   DASH/TLC Sodium & Cholesterol Restricted (DASH)  Supplement Feeding Modality:  Oral  Ensure Enlive Cans or Servings Per Day:  2       Frequency:  Two Times a day  ___________________    ==================>> VITAL SIGNS <<==================    T(C): 37.1 (08-04-22 @ 04:05), Max: 37.1 (08-04-22 @ 04:05)  HR: 76 (08-04-22 @ 04:05) (75 - 79)  BP: 104/62 (08-04-22 @ 04:50) (96/56 - 131/61)  BP(mean): --  RR: 16 (08-04-22 @ 07:30) (16 - 18)  SpO2: 93% (08-04-22 @ 04:05) (93% - 95%)       I&O's Summary    03 Aug 2022 07:01  -  04 Aug 2022 07:00  --------------------------------------------------------  IN: 1080 mL / OUT: 0 mL / NET: 1080 mL       ==================>> LAB AND IMAGING <<==================                        12.4   7.21  )-----------( 282      ( 04 Aug 2022 07:05 )             37.0        08-03    136  |  102  |  20  ----------------------------<  82  4.3   |  21<L>  |  0.86    Ca    8.8      03 Aug 2022 07:12      PTT - ( 03 Aug 2022 07:11 )  PTT:30.6 sec                 TSH:      1.87   (08-01-22)           Lipid profile:  (08-01-22)     Total: 251     LDL  : (p)     HDL  :61     TG   :80     WBC count:   7.21 <<== ,  7.54 <<== ,  9.87 <<== ,  11.02 <<== ,  11.56 <<== ,  10.22 <<==   Hemoglobin:   12.4 <<==,  12.9 <<==,  13.9 <<==,  13.3 <<==,  13.8 <<==,  14.1 <<==  platelets:  282 <==, 261 <==, 236 <==, 212 <==, 199 <==, 191 <==    Creatinine:  0.86  <<==, 0.74  <<==, 0.90  <<==  Sodium:   136  <==, 133  <==, 136  <==       AST:          16 <== , 15 <==      ALT:        12  <== , 15  <==      AP:        68  <=, 74  <=     Bili:        0.6  <=, 0.8  <=

## 2022-08-04 NOTE — PROGRESS NOTE ADULT - REASON FOR ADMISSION
abd pain >> PE

## 2024-04-06 NOTE — PROGRESS NOTE ADULT - SUBJECTIVE AND OBJECTIVE BOX
CARDIOLOGY     PROGRESS  NOTE   ________________________________________________    CHIEF COMPLAINT:Patient is a 88y old  Female who presents with a chief complaint of abd pain >> PE (01 Aug 2022 16:45)    	  REVIEW OF SYSTEMS:  CONSTITUTIONAL: No fever, weight loss, or fatigue  EYES: No eye pain, visual disturbances, or discharge  ENT:  No difficulty hearing, tinnitus, vertigo; No sinus or throat pain  NECK: No pain or stiffness  RESPIRATORY: No cough, wheezing, chills or hemoptysis; No Shortness of Breath  CARDIOVASCULAR: No chest pain, palpitations, passing out, dizziness, or leg swelling  GASTROINTESTINAL: No abdominal or epigastric pain. No nausea, vomiting, or hematemesis; No diarrhea or constipation. No melena or hematochezia.  GENITOURINARY: No dysuria, frequency, hematuria, or incontinence  NEUROLOGICAL: No headaches, memory loss, loss of strength, numbness, or tremors  SKIN: No itching, burning, rashes, or lesions   LYMPH Nodes: No enlarged glands  ENDOCRINE: No heat or cold intolerance; No hair loss  MUSCULOSKELETAL: No joint pain or swelling; No muscle, back, or extremity pain  PSYCHIATRIC: No depression, anxiety, mood swings, or difficulty sleeping  HEME/LYMPH: No easy bruising, or bleeding gums  ALLERGY AND IMMUNOLOGIC: No hives or eczema	    [ ] All others negative	  [x ] Unable to obtain    PHYSICAL EXAM:  T(C): 36.7 (08-02-22 @ 05:35), Max: 36.7 (08-02-22 @ 05:35)  HR: 91 (08-02-22 @ 05:35) (67 - 91)  BP: 136/58 (08-02-22 @ 05:35) (136/58 - 159/66)  RR: 18 (08-02-22 @ 05:35) (17 - 18)  SpO2: 94% (08-02-22 @ 05:35) (94% - 95%)  Wt(kg): --  I&O's Summary      Appearance: Normal	  HEENT:   Normal oral mucosa, PERRL, EOMI	  Lymphatic: No lymphadenopathy  Cardiovascular: Normal S1 S2, No JVD, + murmurs, No edema  Respiratory: Lungs clear to auscultation	  Psychiatry: A & O x 3, Mood & affect appropriate  Gastrointestinal:  Soft, Non-tender, + BS	  Skin: No rashes, No ecchymoses, No cyanosis	  Neurologic: Non-focal  Extremities: Normal range of motion, No clubbing, cyanosis or edema  Vascular: Peripheral pulses palpable 2+ bilaterally    MEDICATIONS  (STANDING):  amoxicillin  500 milliGRAM(s)/clavulanate 1 Tablet(s) Oral every 12 hours  apixaban 10 milliGRAM(s) Oral every 12 hours  aspirin enteric coated 81 milliGRAM(s) Oral daily  atorvastatin 20 milliGRAM(s) Oral at bedtime  donepezil 5 milliGRAM(s) Oral at bedtime  methimazole 2.5 milliGRAM(s) Oral daily  metoprolol tartrate 25 milliGRAM(s) Oral two times a day  pantoprazole    Tablet 40 milliGRAM(s) Oral before breakfast      TELEMETRY: 	    ECG:  	  RADIOLOGY:  OTHER: 	  	  LABS:	 	    CARDIAC MARKERS:                                13.9   9.87  )-----------( 236      ( 02 Aug 2022 07:41 )             40.6     08-01    133<L>  |  100  |  18  ----------------------------<  137<H>  3.8   |  19<L>  |  0.74    Ca    9.0      01 Aug 2022 07:51  Mg     1.6     07-31    TPro  6.9  /  Alb  3.5  /  TBili  0.6  /  DBili  x   /  AST  16  /  ALT  12  /  AlkPhos  68  08-01    proBNP:   Lipid Profile: Cholesterol 251  LDL --  HDL 61  TG 80    HgA1c:   TSH: Thyroid Stimulating Hormone, Serum: 1.87 uIU/mL (08-01 @ 07:51)    PTT - ( 02 Aug 2022 07:41 )  PTT:64.3 sec  < from: Transthoracic Echocardiogram (08.01.22 @ 13:44) >  1. Calcified trileaflet aortic valve with normal opening.  Moderate aortic regurgitation.  2. Increased relative wall thickness with normal left  ventricular mass index, consistent with concentric left  ventricular remodeling.  3. Hyperdynamic left ventricular systolic function.  4. Normal right ventricular size and function.  5. Normal tricuspid valve. Moderate tricuspid  regurgitation.  6. Estimated pulmonary artery systolic pressure equals 39  mm Hg, assuming right atrial pressure equals 8 mm Hg,  consistent with borderline pulmonary pressures.      Assessment and plan  ---------------------------   Pt is a 87 yo f poor-historian with pmh arrythmia (unknown which kind) and hypothyroid who presents to ED with few days of ruq abdominal pain that radiates to back and shortness of breath, no cp, n/v/f/c. No hx of dvt. Takes aspirin intermittently for pain.  pt with LBL significant PE / vascular cardiology appreciated  Penetrating Aortic Ulcer/ vascular appreciated no surgery, cleared for AC  beta blocker as tolerated  started on IV Heparin  +doppler  r/o malignancy  fu trop  pro bnp  echo in am  pt is doing well, o2 sat is okay  pt with hx of prior DVT ?etiology  vascular follow up  echo no r heart strain    	        
           CARDIOLOGY     PROGRESS  NOTE   ________________________________________________    CHIEF COMPLAINT:Patient is a 88y old  Female who presents with a chief complaint of abd pain >> PE (02 Aug 2022 13:47)  doing better.  	  REVIEW OF SYSTEMS:  CONSTITUTIONAL: No fever, weight loss, or fatigue  EYES: No eye pain, visual disturbances, or discharge  ENT:  No difficulty hearing, tinnitus, vertigo; No sinus or throat pain  NECK: No pain or stiffness  RESPIRATORY: No cough, wheezing, chills or hemoptysis; No Shortness of Breath  CARDIOVASCULAR: No chest pain, palpitations, passing out, dizziness, or leg swelling  GASTROINTESTINAL: No abdominal or epigastric pain. No nausea, vomiting, or hematemesis; No diarrhea or constipation. No melena or hematochezia.  GENITOURINARY: No dysuria, frequency, hematuria, or incontinence  NEUROLOGICAL: No headaches, memory loss, loss of strength, numbness, or tremors  SKIN: No itching, burning, rashes, or lesions   LYMPH Nodes: No enlarged glands  ENDOCRINE: No heat or cold intolerance; No hair loss  MUSCULOSKELETAL: No joint pain or swelling; No muscle, back, or extremity pain  PSYCHIATRIC: No depression, anxiety, mood swings, or difficulty sleeping  HEME/LYMPH: No easy bruising, or bleeding gums  ALLERGY AND IMMUNOLOGIC: No hives or eczema	    [ ] All others negative	  [ ] Unable to obtain    PHYSICAL EXAM:  T(C): 36.8 (08-03-22 @ 04:22), Max: 37.1 (08-02-22 @ 13:30)  HR: 81 (08-03-22 @ 04:22) (77 - 99)  BP: 115/70 (08-03-22 @ 04:22) (115/70 - 146/73)  RR: 18 (08-03-22 @ 04:22) (18 - 18)  SpO2: 97% (08-03-22 @ 04:22) (93% - 97%)  Wt(kg): --  I&O's Summary      Appearance: Normal	  HEENT:   Normal oral mucosa, PERRL, EOMI	  Lymphatic: No lymphadenopathy  Cardiovascular: Normal S1 S2, No JVD, + murmurs, No edema  Respiratory: Lungs clear to auscultation	  Psychiatry: A & O x 3, Mood & affect appropriate  Gastrointestinal:  Soft, Non-tender, + BS	  Skin: No rashes, No ecchymoses, No cyanosis	  Neurologic: Non-focal  Extremities: Normal range of motion, No clubbing, cyanosis or edema  Vascular: Peripheral pulses palpable 2+ bilaterally    MEDICATIONS  (STANDING):  amoxicillin  500 milliGRAM(s)/clavulanate 1 Tablet(s) Oral every 12 hours  apixaban 10 milliGRAM(s) Oral every 12 hours  aspirin enteric coated 81 milliGRAM(s) Oral daily  atorvastatin 20 milliGRAM(s) Oral at bedtime  donepezil 5 milliGRAM(s) Oral at bedtime  methimazole 2.5 milliGRAM(s) Oral daily  metoprolol tartrate 25 milliGRAM(s) Oral two times a day  pantoprazole    Tablet 40 milliGRAM(s) Oral before breakfast      TELEMETRY: 	    ECG:  	  RADIOLOGY:  OTHER: 	  	  LABS:	 	    CARDIAC MARKERS:                                13.9   9.87  )-----------( 236      ( 02 Aug 2022 07:41 )             40.6     08-01    133<L>  |  100  |  18  ----------------------------<  137<H>  3.8   |  19<L>  |  0.74    Ca    9.0      01 Aug 2022 07:51    TPro  6.9  /  Alb  3.5  /  TBili  0.6  /  DBili  x   /  AST  16  /  ALT  12  /  AlkPhos  68  08-01    proBNP:   Lipid Profile: Cholesterol 251  LDL --  HDL 61  TG 80    HgA1c:   TSH: Thyroid Stimulating Hormone, Serum: 1.87 uIU/mL (08-01 @ 07:51)    PTT - ( 02 Aug 2022 07:41 )  PTT:64.3 sec  - asymptomatic aortic ulceration - no acute surgical intervention okay to give hep gtt for PEs  - appreciate recs of primary team  - will follow  - pain is not characteristic 2/2 to aortic etiology, continue to monitor pain and physical exam    < from: Transthoracic Echocardiogram (08.01.22 @ 13:44) >  1. Calcified trileaflet aortic valve with normal opening.  Moderate aortic regurgitation.  2. Increased relative wall thickness with normal left  ventricular mass index, consistent with concentric left  ventricular remodeling.  3. Hyperdynamic left ventricular systolic function.  4. Normal right ventricular size and function.  5. Normal tricuspid valve. Moderate tricuspid  regurgitation.  6. Estimated pulmonary artery systolic pressure equals 39  mm Hg, assuming right atrial pressure equals 8 mm Hg,  consistent with borderline pulmonary pressures.        Assessment and plan  ---------------------------   Pt is a 89 yo f poor-historian with pmh arrythmia (unknown which kind) and hypothyroid who presents to ED with few days of ruq abdominal pain that radiates to back and shortness of breath, no cp, n/v/f/c. No hx of dvt. Takes aspirin intermittently for pain.  pt with LBL significant PE / vascular cardiology appreciated  Penetrating Aortic Ulcer/ vascular appreciated no surgery, cleared for AC  beta blocker as tolerated  started on IV Heparin  +doppler  r/o malignancy ?cause of dvt/ PE chronic  fu trop  pro bnp  pt is doing well, o2 sat is okay  pt with hx of prior DVT ?etiology  vascular noted regarding the Aorta  echo no r heart strain  heparin switched to NOAC  no arrythmia on tele  	        
           CARDIOLOGY     PROGRESS  NOTE   ________________________________________________    CHIEF COMPLAINT:Patient is a 88y old  Female who presents with a chief complaint of abd pain >> PE (03 Aug 2022 18:11)  no complain.  	  REVIEW OF SYSTEMS:  CONSTITUTIONAL: No fever, weight loss, or fatigue  EYES: No eye pain, visual disturbances, or discharge  ENT:  No difficulty hearing, tinnitus, vertigo; No sinus or throat pain  NECK: No pain or stiffness  RESPIRATORY: No cough, wheezing, chills or hemoptysis; No Shortness of Breath  CARDIOVASCULAR: No chest pain, palpitations, passing out, dizziness, or leg swelling  GASTROINTESTINAL: No abdominal or epigastric pain. No nausea, vomiting, or hematemesis; No diarrhea or constipation. No melena or hematochezia.  GENITOURINARY: No dysuria, frequency, hematuria, or incontinence  NEUROLOGICAL: No headaches, memory loss, loss of strength, numbness, or tremors  SKIN: No itching, burning, rashes, or lesions   LYMPH Nodes: No enlarged glands  ENDOCRINE: No heat or cold intolerance; No hair loss  MUSCULOSKELETAL: No joint pain or swelling; No muscle, back, or extremity pain  PSYCHIATRIC: No depression, anxiety, mood swings, or difficulty sleeping  HEME/LYMPH: No easy bruising, or bleeding gums  ALLERGY AND IMMUNOLOGIC: No hives or eczema	    [ ] All others negative	  [ ] Unable to obtain    PHYSICAL EXAM:  T(C): 37.1 (08-04-22 @ 04:05), Max: 37.1 (08-04-22 @ 04:05)  HR: 76 (08-04-22 @ 04:05) (75 - 84)  BP: 104/62 (08-04-22 @ 04:50) (96/56 - 131/61)  RR: 18 (08-04-22 @ 04:05) (17 - 19)  SpO2: 93% (08-04-22 @ 04:05) (93% - 95%)  Wt(kg): --  I&O's Summary    03 Aug 2022 07:01  -  04 Aug 2022 07:00  --------------------------------------------------------  IN: 1080 mL / OUT: 0 mL / NET: 1080 mL        Appearance: Normal	  HEENT:   Normal oral mucosa, PERRL, EOMI	  Lymphatic: No lymphadenopathy  Cardiovascular: Normal S1 S2, No JVD, + murmurs, No edema  Respiratory: rhonchi  Psychiatry: A & O x 3, Mood & affect appropriate  Gastrointestinal:  Soft, Non-tender, + BS	  Skin: No rashes, No ecchymoses, No cyanosis	  Neurologic: Non-focal  Extremities: Normal range of motion, No clubbing, cyanosis or edema  Vascular: Peripheral pulses palpable 2+ bilaterally    MEDICATIONS  (STANDING):  amoxicillin  500 milliGRAM(s)/clavulanate 1 Tablet(s) Oral every 12 hours  apixaban 10 milliGRAM(s) Oral every 12 hours  aspirin enteric coated 81 milliGRAM(s) Oral daily  atorvastatin 20 milliGRAM(s) Oral at bedtime  donepezil 5 milliGRAM(s) Oral at bedtime  methimazole 2.5 milliGRAM(s) Oral daily  metoprolol tartrate 25 milliGRAM(s) Oral two times a day  pantoprazole    Tablet 40 milliGRAM(s) Oral before breakfast      TELEMETRY: 	    ECG:  	  RADIOLOGY:  OTHER: 	  	  LABS:	 	    CARDIAC MARKERS:                                12.9   7.54  )-----------( 261      ( 03 Aug 2022 07:11 )             37.8     08-03    136  |  102  |  20  ----------------------------<  82  4.3   |  21<L>  |  0.86    Ca    8.8      03 Aug 2022 07:12      proBNP:   Lipid Profile: Cholesterol 251  LDL --  HDL 61  TG 80    HgA1c:   TSH: Thyroid Stimulating Hormone, Serum: 1.87 uIU/mL (08-01 @ 07:51)    PTT - ( 03 Aug 2022 07:11 )  PTT:30.6 sec      Assessment and plan  ---------------------------   Pt is a 89 yo f poor-historian with pmh arrythmia (unknown which kind) and hypothyroid who presents to ED with few days of ruq abdominal pain that radiates to back and shortness of breath, no cp, n/v/f/c. No hx of dvt. Takes aspirin intermittently for pain.  pt with LBL significant PE / vascular cardiology appreciated  Penetrating Aortic Ulcer/ vascular appreciated no surgery, cleared for AC  beta blocker as tolerated  started on IV Heparin  +doppler  r/o malignancy ?cause of dvt/ PE chronic  fu trop  pro bnp  pt is doing well, o2 sat is okay  pt with hx of prior DVT ?etiology  vascular noted regarding the Aorta  echo no r heart strain  heparin switched to NOAC  no arrythmia on tele  continue current meds    	        
           CARDIOLOGY     PROGRESS  NOTE   ________________________________________________    CHIEF COMPLAINT:Patient is a 88y old  Female who presents with a chief complaint of abd pain >> PE (31 Jul 2022 22:43)  doing well, no complain.  	  REVIEW OF SYSTEMS:  CONSTITUTIONAL: No fever, weight loss, or fatigue  EYES: No eye pain, visual disturbances, or discharge  ENT:  No difficulty hearing, tinnitus, vertigo; No sinus or throat pain  NECK: No pain or stiffness  RESPIRATORY: No cough, wheezing, chills or hemoptysis; No Shortness of Breath  CARDIOVASCULAR: No chest pain, palpitations, passing out, dizziness, or leg swelling  GASTROINTESTINAL: No abdominal or epigastric pain. No nausea, vomiting, or hematemesis; No diarrhea or constipation. No melena or hematochezia.  GENITOURINARY: No dysuria, frequency, hematuria, or incontinence  NEUROLOGICAL: No headaches, memory loss, loss of strength, numbness, or tremors  SKIN: No itching, burning, rashes, or lesions   LYMPH Nodes: No enlarged glands  ENDOCRINE: No heat or cold intolerance; No hair loss  MUSCULOSKELETAL: No joint pain or swelling; No muscle, back, or extremity pain  PSYCHIATRIC: No depression, anxiety, mood swings, or difficulty sleeping  HEME/LYMPH: No easy bruising, or bleeding gums  ALLERGY AND IMMUNOLOGIC: No hives or eczema	    [ ] All others negative	  [ ] Unable to obtain    PHYSICAL EXAM:  T(C): 36.5 (08-01-22 @ 08:26), Max: 37.6 (07-31-22 @ 16:57)  HR: 58 (08-01-22 @ 08:26) (58 - 110)  BP: 115/60 (08-01-22 @ 07:06) (113/84 - 151/72)  RR: 18 (08-01-22 @ 08:26) (18 - 37)  SpO2: 95% (08-01-22 @ 08:26) (94% - 97%)  Wt(kg): --  I&O's Summary      Appearance: Normal	  HEENT:   Normal oral mucosa, PERRL, EOMI	  Lymphatic: No lymphadenopathy  Cardiovascular: Normal S1 S2, No JVD, + murmurs, No edema  Respiratory: rhonchoi  Psychiatry: A & O x 3, Mood & affect appropriate  Gastrointestinal:  Soft, Non-tender, + BS	  Skin: No rashes, No ecchymoses, No cyanosis	  Neurologic: Non-focal  Extremities: Normal range of motion, No clubbing, cyanosis or edema  Vascular: Peripheral pulses palpable 2+ bilaterally    MEDICATIONS  (STANDING):  aspirin enteric coated 81 milliGRAM(s) Oral daily  atorvastatin 20 milliGRAM(s) Oral at bedtime  donepezil 5 milliGRAM(s) Oral at bedtime  heparin  Infusion.  Unit(s)/Hr (11 mL/Hr) IV Continuous <Continuous>  methimazole 2.5 milliGRAM(s) Oral daily  metoprolol tartrate 25 milliGRAM(s) Oral two times a day      TELEMETRY: 	    ECG:  	  RADIOLOGY:  OTHER: 	  	  LABS:	 	    CARDIAC MARKERS:                                13.3   11.02 )-----------( 212      ( 01 Aug 2022 07:51 )             39.3     08-01    133<L>  |  100  |  18  ----------------------------<  137<H>  3.8   |  19<L>  |  0.74    Ca    9.0      01 Aug 2022 07:51  Mg     1.6     07-31    TPro  6.9  /  Alb  3.5  /  TBili  0.6  /  DBili  x   /  AST  16  /  ALT  12  /  AlkPhos  68  08-01    proBNP:   Lipid Profile:   HgA1c:   TSH:   PTT - ( 31 Jul 2022 23:44 )  PTT:87.9 sec  Troponin T, High Sensitivity (07.31.22 @ 12:40)    Troponin T, High Sensitivity Result: 16: Specimen not hemolyzed  *  *  Rapid upward or downward changes in high-sensitivity troponin levels  suggest acute myocardial injury. Renal impairment may cause sustained  troponin elevations.  Normal: <6 - 14 ng/L  Indeterminate: 15-51 ng/L  Elevated: > 51 ng/L  See http://labs/test/TROPTHS on the [facility] intranet for more  information ng/L        Assessment and plan  ---------------------------   Pt is a 89 yo f poor-historian with pmh arrythmia (unknown which kind) and hypothyroid who presents to ED with few days of ruq abdominal pain that radiates to back and shortness of breath, no cp, n/v/f/c. No hx of dvt. Takes aspirin intermittently for pain.  pt with LBL significant PE / vascular cardiology appreciated  Penetrating Aortic Ulcer/ vascular appreciated no surgery, cleared for AC  beta blocker as tolerated  started on IV Heparin  +doppler  r/o malignancy  fu trop  pro bnp  echo in am  pt is doing well, o2 sat is okay  pt with hx of prior DVT ?etiology  vascular follow up  awaiting TTE    	        
Abdominal Pain, N/V/D

## 2024-08-03 NOTE — DISCHARGE NOTE NURSING/CASE MANAGEMENT/SOCIAL WORK - NURSING SECTION COMPLETE
strength 5/5 bilateral upper extremities/strength 5/5 bilateral lower extremities Patient/Caregiver provided printed discharge information.

## 2025-06-19 NOTE — ED PROVIDER NOTE - CCCP TRG CHIEF CMPLNT
Medication: Losartan and Wegovy  Medication refill denied. Refills are on file at pharmacy.    Medication failed protocol.    Medication: Metoprolol  Medication Refill Protocol Failed.  Medication Refill Protocol Failed. Courtesy Refill provided after MWV completed for this year per protocol guidelines. Writer confirmed, courtesy refill was not a duplicate.  Last office visit date: 4/4/25 with PCP   Next appointment scheduled?: Yes with PCP       chest pain